# Patient Record
Sex: FEMALE | Race: WHITE | NOT HISPANIC OR LATINO | Employment: FULL TIME | URBAN - METROPOLITAN AREA
[De-identification: names, ages, dates, MRNs, and addresses within clinical notes are randomized per-mention and may not be internally consistent; named-entity substitution may affect disease eponyms.]

---

## 2018-09-26 ENCOUNTER — OFFICE VISIT (OUTPATIENT)
Dept: URGENT CARE | Facility: CLINIC | Age: 38
End: 2018-09-26
Payer: COMMERCIAL

## 2018-09-26 VITALS
OXYGEN SATURATION: 94 % | SYSTOLIC BLOOD PRESSURE: 108 MMHG | DIASTOLIC BLOOD PRESSURE: 76 MMHG | BODY MASS INDEX: 40.02 KG/M2 | HEART RATE: 67 BPM | TEMPERATURE: 97.6 F | HEIGHT: 66 IN | WEIGHT: 249 LBS | RESPIRATION RATE: 16 BRPM

## 2018-09-26 DIAGNOSIS — H66.003 ACUTE SUPPURATIVE OTITIS MEDIA OF BOTH EARS WITHOUT SPONTANEOUS RUPTURE OF TYMPANIC MEMBRANES, RECURRENCE NOT SPECIFIED: Primary | ICD-10-CM

## 2018-09-26 DIAGNOSIS — H10.32 ACUTE BACTERIAL CONJUNCTIVITIS OF LEFT EYE: ICD-10-CM

## 2018-09-26 PROCEDURE — 99214 OFFICE O/P EST MOD 30 MIN: CPT | Performed by: INTERNAL MEDICINE

## 2018-09-26 RX ORDER — SULFAMETHOXAZOLE AND TRIMETHOPRIM 800; 160 MG/1; MG/1
1 TABLET ORAL 2 TIMES DAILY
Qty: 20 TAB | Refills: 0 | Status: SHIPPED | OUTPATIENT
Start: 2018-09-26 | End: 2018-10-06

## 2018-09-26 RX ORDER — ESCITALOPRAM OXALATE 10 MG/1
10 TABLET ORAL DAILY
COMMUNITY
End: 2021-05-22

## 2018-09-26 RX ORDER — MOXIFLOXACIN 5 MG/ML
1 SOLUTION/ DROPS OPHTHALMIC 3 TIMES DAILY
Qty: 1 BOTTLE | Refills: 0 | Status: SHIPPED | OUTPATIENT
Start: 2018-09-26 | End: 2019-09-25

## 2018-09-26 ASSESSMENT — ENCOUNTER SYMPTOMS
NAUSEA: 0
WHEEZING: 0
SPUTUM PRODUCTION: 0
ABDOMINAL PAIN: 0
SINUS PAIN: 1
CHILLS: 0
CONSTIPATION: 0
COUGH: 1
SORE THROAT: 1
FEVER: 0
VOMITING: 0
DIARRHEA: 0
MYALGIAS: 1
SHORTNESS OF BREATH: 0
SWEATS: 0

## 2018-09-26 ASSESSMENT — COPD QUESTIONNAIRES: COPD: 0

## 2018-09-26 ASSESSMENT — PATIENT HEALTH QUESTIONNAIRE - PHQ9: CLINICAL INTERPRETATION OF PHQ2 SCORE: 0

## 2018-09-26 NOTE — PROGRESS NOTES
Subjective:   Tae Banegas is a 38 y.o. female who presents for Eye Problem (Started this morning, Swollen, and pink, slightly itchy) and Cough (x4 days, Constant, constant runny nose)        Conjunctivitis   This is a new problem. The current episode started today. The problem occurs constantly. The problem has been gradually worsening. Associated symptoms include congestion, coughing, myalgias and a sore throat. Pertinent negatives include no abdominal pain, chills, fever, nausea or vomiting.   Cough   This is a new problem. Episode onset: 3 days ago  The problem has been gradually worsening. The cough is non-productive. Associated symptoms include ear congestion, ear pain, myalgias, nasal congestion and a sore throat. Pertinent negatives include no chills, fever, shortness of breath, sweats or wheezing. Risk factors for lung disease include travel (Starting feeling sick in [facility]). There is no history of asthma, bronchitis, COPD, emphysema or pneumonia.       Pt was treated for pharyngitis before going to [place] with Azithromycin. Course completed on Sept 14th.  Denies history of asthma.  No shortness of breath.    Review of Systems   Constitutional: Positive for malaise/fatigue. Negative for chills and fever.   HENT: Positive for congestion, ear pain, sinus pain and sore throat.    Respiratory: Positive for cough. Negative for sputum production, shortness of breath and wheezing.    Gastrointestinal: Negative for abdominal pain, constipation, diarrhea, nausea and vomiting.   Musculoskeletal: Positive for myalgias.   All other systems reviewed and are negative.      PMH:  has a past medical history of Other specified symptom associated with female genital organs; Psychiatric disorder; and Unspecified disorder of menstruation and other abnormal bleeding from female genital tract.  MEDS:   Current Outpatient Prescriptions:   •  escitalopram (LEXAPRO) 10 MG Tab, Take 10 mg by mouth every day., Disp: , Rfl:  "  •  sulfamethoxazole-trimethoprim (BACTRIM DS) 800-160 MG tablet, Take 1 Tab by mouth 2 times a day for 10 days., Disp: 20 Tab, Rfl: 0  •  moxifloxacin (VIGAMOX) 0.5 % Solution, Place 1 Drop in both eyes 3 times a day., Disp: 1 Bottle, Rfl: 0  •  ondansetron (ZOFRAN ODT) 4 MG TBDP, Take 1 Tab by mouth every 8 hours as needed for Nausea/Vomiting., Disp: 10 Tab, Rfl: 0  •  phenazopyridine (PYRIDIUM) 200 MG TABS, Take 1 Tab by mouth 3 times a day as needed for Mild Pain., Disp: 15 Tab, Rfl: 0  •  Hydrocod Polst-Chlorphen Polst (TUSSIONEX PENNKINETIC ER) 10-8 MG/5ML LQCR, Take 5 mL by mouth every 12 hours., Disp: 50 mL, Rfl: 0  •  alprazolam (XANAX) 0.25 MG TABS, Take 0.25 mg by mouth 3 times a day as needed., Disp: , Rfl:   •  IBUPROFEN 200 MG PO TABS, PRN Pt takes 3 tabs at a time, Disp: , Rfl:   ALLERGIES:   Allergies   Allergen Reactions   • Penicillins Rash     SURGHX:   Past Surgical History:   Procedure Laterality Date   • ACL RECONSTRUCTION SCOPE  6/3/2010    Performed by AMANDA MOJICA at [facility] ORS   • GYN SURGERY  2001    right ovary removed for ovarian cyst   • DENTAL EXTRACTION(S)  2000    wisdom teeth      SOCHX:  reports that she has never smoked. She has never used smokeless tobacco. She reports that she drinks about 3.5 oz of alcohol per week . She reports that she does not use drugs.  Family History   Problem Relation Age of Onset   • Cancer Mother    • Heart Disease Unknown    • Cancer Unknown         Objective:   /76 (BP Location: Left arm, Patient Position: Sitting, BP Cuff Size: Large adult)   Pulse 67   Temp 36.4 °C (97.6 °F) (Temporal)   Resp 16   Ht 1.676 m (5' 6\")   Wt 112.9 kg (249 lb)   SpO2 94%   BMI 40.19 kg/m²     Physical Exam   Constitutional: She is oriented to person, place, and time. She appears well-developed and well-nourished. No distress.   HENT:   Head: Normocephalic and atraumatic.   Right Ear: Tympanic membrane is erythematous and retracted. A " middle ear effusion is present.   Left Ear: Tympanic membrane is erythematous and bulging. A middle ear effusion is present.   Eyes: Pupils are equal, round, and reactive to light. Lids are normal. Right eye exhibits discharge and exudate. Left eye exhibits no discharge and no exudate. Right conjunctiva is injected. Left conjunctiva is not injected.   Cardiovascular: Normal rate and regular rhythm.    Pulmonary/Chest: Effort normal and breath sounds normal.   Neurological: She is alert and oriented to person, place, and time.   Skin: Skin is warm and dry.   Psychiatric: She has a normal mood and affect. Her behavior is normal.   Vitals reviewed.      Repeat SpO2 98%  Assessment/Plan:     1. Acute suppurative otitis media of both ears without spontaneous rupture of tympanic membranes, recurrence not specified  sulfamethoxazole-trimethoprim (BACTRIM DS) 800-160 MG tablet   2. Acute bacterial conjunctivitis of left eye  moxifloxacin (VIGAMOX) 0.5 % Solution     Patient directed to take full course of abx regardless of sx resolution. If sx worsen or persist patient directed to return to clinic for reevaluation. Supportive care reviewed including: Decongestant, increase fluids, OTC cold medication.  Otitis media and conjunctivitis educational handout given to patient. Infection control and and hand hygiene reviewed.    Differential diagnosis, natural history, supportive care discussed. Follow-up with primary care provider within 7-10 days, emergency room precautions discussed.  Patient appears understanding of information.  Case and results reviewed and agree with treatment plan as outlined.  Dr. Frost

## 2018-09-26 NOTE — PATIENT INSTRUCTIONS
"Allegra-D or zyrtec-D    Otitis Media, Adult  Otitis media is redness, soreness, and puffiness (swelling) in the space just behind your eardrum (middle ear). It may be caused by allergies or infection. It often happens along with a cold.  Follow these instructions at home:  · Take your medicine as told. Finish it even if you start to feel better.  · Only take over-the-counter or prescription medicines for pain, discomfort, or fever as told by your doctor.  · Follow up with your doctor as told.  Contact a doctor if:  · You have otitis media only in one ear, or bleeding from your nose, or both.  · You notice a lump on your neck.  · You are not getting better in 3-5 days.  · You feel worse instead of better.  Get help right away if:  · You have pain that is not helped with medicine.  · You have puffiness, redness, or pain around your ear.  · You get a stiff neck.  · You cannot move part of your face (paralysis).  · You notice that the bone behind your ear hurts when you touch it.  This information is not intended to replace advice given to you by your health care provider. Make sure you discuss any questions you have with your health care provider.  Document Released: 06/05/2009 Document Revised: 05/25/2017 Document Reviewed: 07/15/2014  [employer] Interactive Patient Education © 2017 [employer]  Conjunctivitis  Conjunctivitis is commonly called \"pink eye.\" Conjunctivitis can be caused by bacterial or viral infection, allergies, or injuries. There is usually redness of the lining of the eye, itching, discomfort, and sometimes discharge. There may be deposits of matter along the eyelids. A viral infection usually causes a watery discharge, while a bacterial infection causes a yellowish, thick discharge. Pink eye is very contagious and spreads by direct contact.  You may be given antibiotic eyedrops as part of your treatment. Before using your eye medicine, remove all drainage from the eye by washing gently with warm " water and cotton balls. Continue to use the medication until you have awakened 2 mornings in a row without discharge from the eye. Do not rub your eye. This increases the irritation and helps spread infection. Use separate towels from other household members. Wash your hands with soap and water before and after touching your eyes. Use cold compresses to reduce pain and sunglasses to relieve irritation from light. Do not wear contact lenses or wear eye makeup until the infection is gone.  SEEK MEDICAL CARE IF:   · Your symptoms are not better after 3 days of treatment.  · You have increased pain or trouble seeing.  · The outer eyelids become very red or swollen.  Document Released: 01/25/2006 Document Revised: 03/11/2013 Document Reviewed: 12/18/2006  [employer]® Patient Information ©2014 [employer].

## 2018-09-27 ENCOUNTER — TELEPHONE (OUTPATIENT)
Dept: URGENT CARE | Facility: CLINIC | Age: 38
End: 2018-09-27

## 2018-10-13 ENCOUNTER — APPOINTMENT (OUTPATIENT)
Dept: RADIOLOGY | Facility: IMAGING CENTER | Age: 38
End: 2018-10-13
Attending: EMERGENCY MEDICINE
Payer: COMMERCIAL

## 2018-10-13 ENCOUNTER — OFFICE VISIT (OUTPATIENT)
Dept: URGENT CARE | Facility: CLINIC | Age: 38
End: 2018-10-13
Payer: COMMERCIAL

## 2018-10-13 VITALS
RESPIRATION RATE: 20 BRPM | TEMPERATURE: 97.8 F | HEART RATE: 60 BPM | WEIGHT: 240 LBS | BODY MASS INDEX: 38.57 KG/M2 | SYSTOLIC BLOOD PRESSURE: 110 MMHG | HEIGHT: 66 IN | DIASTOLIC BLOOD PRESSURE: 72 MMHG

## 2018-10-13 DIAGNOSIS — S83.002A PATELLAR SUBLUXATION, LEFT, INITIAL ENCOUNTER: ICD-10-CM

## 2018-10-13 DIAGNOSIS — S89.92XA KNEE INJURY, LEFT, INITIAL ENCOUNTER: ICD-10-CM

## 2018-10-13 PROCEDURE — 99213 OFFICE O/P EST LOW 20 MIN: CPT | Performed by: EMERGENCY MEDICINE

## 2018-10-13 PROCEDURE — 73564 X-RAY EXAM KNEE 4 OR MORE: CPT | Mod: 26,LT | Performed by: EMERGENCY MEDICINE

## 2018-10-13 ASSESSMENT — ENCOUNTER SYMPTOMS
SENSORY CHANGE: 0
FOCAL WEAKNESS: 0
JOINT SWELLING: 0
FEVER: 0
NUMBNESS: 0

## 2018-10-13 NOTE — PROGRESS NOTES
Subjective:      Tae Banegas is a 38 y.o. female who presents with Knee Injury (Hurt left knee coule days ago)            Knee Injury   This is a new problem. Episode onset: 3 days. The problem occurs daily. The problem has been rapidly improving. Pertinent negatives include no fever, joint swelling, numbness or rash. The symptoms are aggravated by bending. She has tried rest and NSAIDs for the symptoms. The treatment provided moderate relief.   PMH right ACL tear with reconstruction.  Notes popping sensation, pain anterolateral patellar region when sidestepping.  Denies additional trauma.  Review of Systems   Constitutional: Negative for fever.   Musculoskeletal: Negative for joint swelling.        No pain proximal or distal to the site.   Skin: Negative for rash.   Neurological: Negative for sensory change, focal weakness and numbness.       PMH:  has a past medical history of Other specified symptom associated with female genital organs; Psychiatric disorder; and Unspecified disorder of menstruation and other abnormal bleeding from female genital tract.  MEDS:   Current Outpatient Prescriptions:   •  escitalopram (LEXAPRO) 10 MG Tab, Take 10 mg by mouth every day., Disp: , Rfl:   •  moxifloxacin (VIGAMOX) 0.5 % Solution, Place 1 Drop in both eyes 3 times a day., Disp: 1 Bottle, Rfl: 0  •  ondansetron (ZOFRAN ODT) 4 MG TBDP, Take 1 Tab by mouth every 8 hours as needed for Nausea/Vomiting., Disp: 10 Tab, Rfl: 0  •  phenazopyridine (PYRIDIUM) 200 MG TABS, Take 1 Tab by mouth 3 times a day as needed for Mild Pain., Disp: 15 Tab, Rfl: 0  •  Hydrocod Polst-Chlorphen Polst (TUSSIONEX PENNKINETIC ER) 10-8 MG/5ML LQCR, Take 5 mL by mouth every 12 hours., Disp: 50 mL, Rfl: 0  •  alprazolam (XANAX) 0.25 MG TABS, Take 0.25 mg by mouth 3 times a day as needed., Disp: , Rfl:   •  IBUPROFEN 200 MG PO TABS, PRN Pt takes 3 tabs at a time, Disp: , Rfl:   ALLERGIES:   Allergies   Allergen Reactions   • Penicillins Rash  "    SURGHX:   Past Surgical History:   Procedure Laterality Date   • ACL RECONSTRUCTION SCOPE  6/3/2010    Performed by AMANDA MOJICA at SURGERY [facility] ORS   • GYN SURGERY  2001    right ovary removed for ovarian cyst   • DENTAL EXTRACTION(S)  2000    wisdom teeth      SOCHX:  reports that she has never smoked. She has never used smokeless tobacco. She reports that she drinks about 3.5 oz of alcohol per week . She reports that she does not use drugs.  FH: family history includes Cancer in her mother and unknown relative; Heart Disease in her unknown relative.     Objective:     /72 (BP Location: Right arm, Patient Position: Sitting, BP Cuff Size: Large adult)   Pulse 60   Temp 36.6 °C (97.8 °F) (Temporal)   Resp 20   Ht 1.676 m (5' 6\")   Wt 108.9 kg (240 lb)   BMI 38.74 kg/m²      Physical Exam   Constitutional: Vital signs are normal. She appears well-developed and well-nourished. She is cooperative. She does not have a sickly appearance. She does not appear ill. No distress.   Cardiovascular:   Pulses:       Dorsalis pedis pulses are 2+ on the left side.        Posterior tibial pulses are 2+ on the left side.   Musculoskeletal:        Left knee: She exhibits normal range of motion, no swelling, no effusion, no deformity, no LCL laxity, normal patellar mobility, normal meniscus and no MCL laxity. Tenderness found. Medial joint line and patellar tendon tenderness noted. No lateral joint line, no MCL and no LCL tenderness noted.   Negative Lockman, negative drawer.   Neurological: She is alert. Gait normal.   Distal motor function intact. Distal sensation to light touch and pressure intact.   Skin: Skin is warm, dry and intact. No rash noted.          Advised of possibility of associated mild ACL injury.     Assessment/Plan:     1. Patellar subluxation, left, initial encounter  Elevation, ice, OTC analgesia as needed.  Velcro compression sleeve as needed.  REF SPORTS MED    2. Knee injury, left, " initial encounter  - DX-KNEE COMPLETE 4+ LEFT; per radiologist:  No evidence of fracture or dislocation.  Small joint effusion is identified.    Mild left lateral patellar subluxation by my read.

## 2018-10-13 NOTE — PATIENT INSTRUCTIONS
Patellar Dislocation and Subluxation  The kneecap (patella) is located in a groove at the end of the thigh bone (femur). Patellar dislocation and patellar subluxation are injuries that happen when the patella slips out of its normal position. In a patellar subluxation, the patella slips partly out of the groove. In a patellar dislocation, it slips all the way out of the groove.  What are the causes?  This condition may be caused by:  · A hit to the knee.  · Twisting the knee when the foot is planted.  What increases the risk?  This condition is more likely to develop in:  · Athletes in their teens or 20s.  · People who have had this condition before.  · People who play certain kinds of sports, including:  ¨ Sports that include quick turns or changes in direction, or where there is contact, like soccer.  ¨ Sports that require jumping, such as basketball or volleyball.  ¨ Sports in which cleats are worn.  What are the signs or symptoms?  Symptoms of this condition include:  · Sudden pain in the knee.  · A misshapen knee.  · A popping sensation, followed by a feeling that something is out of place.  · Inability to bend or straighten the knee.  · Swelling in the knee.  How is this diagnosed?  This condition may be diagnosed with:  · A physical exam.  · An X-ray exam. This may be done to see the position of the patella or to see if a bone has broken.  · MRI. This may be done to look at the alignment of your knee and the ligaments that hold your patella in place.  How is this treated?  Your patella may move back into place on its own when you straighten your knee. If your patella does not move back into place on its own, your health care provider will move it back into place. After your patella is back in its normal position, treatment may involve:  · Wearing a knee brace to keep your knee from moving (keep it immobilized) while it heals.  · Doing exercises that help improve strength and movement in your knee.  · Taking  medicine to help with pain and inflammation.  · Applying ice to the knee to help with pain and inflammation.  · Having surgery to prevent the patella from slipping out of place or to clean out any loose cartilage in your joint. This may be needed if other treatments do not help or if the condition keeps happening.  Follow these instructions at home:  If you have a brace:  · Wear it as told by your health care provider. Remove it only as told by your health care provider.  · Loosen the brace if your toes tingle, become numb, or turn cold and blue.  · Do not let your brace get wet if it is not waterproof.  · Keep the brace clean.  · If your brace is not waterproof, cover it with a watertight covering when you take a bath or a shower.  Managing pain, stiffness, and swelling  · If directed, apply ice to the injured area.  ¨ Put ice in a plastic bag.  ¨ Place a towel between your skin and the bag.  ¨ Leave the ice on for 20 minutes, 2-3 times a day.  · Move your toes often to avoid stiffness and to lessen swelling.  Activity  · Return to your normal activities as told by your health care provider. Ask your health care provider what activities are safe for you.  · Do exercises as told by your health care provider.  General instructions  · Do not use the injured limb to support your body weight until your health care provider says that you can. Use crutches as told by your health care provider.  · Take over-the-counter and prescription medicines only as told by your health care provider.  · Keep all follow-up visits as told by your health care provider. This is important.  How is this prevented?  · Warm up and stretch before being active.  · Cool down and stretch after being active.  · Give your body time to rest between periods of activity.  · Make sure to use equipment that fits you.  · Be safe and responsible while being active to avoid falls.  · Do at least 150 minutes of moderate-intensity exercise each week, such as  brisk walking or water aerobics.  · Maintain physical fitness, including:  ¨ Strength.  ¨ Flexibility.  ¨ Cardiovascular fitness.  ¨ Endurance.  Get help right away if:  · The pain in your knee gets worse and is not relieved by medicine.  · The inflammation in your knee gets worse.  · Your knee catches or locks.  This information is not intended to replace advice given to you by your health care provider. Make sure you discuss any questions you have with your health care provider.  Document Released: 12/18/2006 Document Revised: 08/22/2017 Document Reviewed: 10/29/2016  [employer] Interactive Patient Education © 2017 [employer]

## 2018-10-16 ENCOUNTER — OFFICE VISIT (OUTPATIENT)
Dept: MEDICAL GROUP | Facility: CLINIC | Age: 38
End: 2018-10-16
Payer: COMMERCIAL

## 2018-10-16 VITALS
RESPIRATION RATE: 18 BRPM | BODY MASS INDEX: 38.57 KG/M2 | HEART RATE: 78 BPM | SYSTOLIC BLOOD PRESSURE: 122 MMHG | HEIGHT: 66 IN | DIASTOLIC BLOOD PRESSURE: 80 MMHG | TEMPERATURE: 98.6 F | WEIGHT: 240 LBS | OXYGEN SATURATION: 98 %

## 2018-10-16 DIAGNOSIS — S83.002A PATELLAR SUBLUXATION, LEFT, INITIAL ENCOUNTER: ICD-10-CM

## 2018-10-16 PROCEDURE — 99203 OFFICE O/P NEW LOW 30 MIN: CPT | Performed by: FAMILY MEDICINE

## 2018-10-16 NOTE — PROGRESS NOTES
CHIEF COMPLAINT:  Tae Banegas female presenting at the request of Catracho Roberto MD for evaluation of knee pain.     Tae Banegas is complaining of left knee pain  Date of injury, October 10, 2018   Mechanism of injury, pivoting/sitting down onto a airline seat  Felt POSITIVE pop in the patellar region  Pain is at the anterolateral knee  Quality is aching  Pain is non-radiating   Improved with resting  Aggravated by movement  no prior problems with this area in the past, but she does have a history of RIGHT ACL tear several years ago  Prior Treatments: Seen in urgent care  Prior studies: X-Ray   Medications tried for pain include: ibuprofen (OTC) which is not helping for pain  Mechanical Symptom history: No Locking, but she has occasional clicking with twisting    REVIEW OF SYSTEMS  No Nausea, No Vomiting, No Chest Pain, No Shortness of Breath, No Dizziness, No Headache      PAST MEDICAL HISTORY:   History reviewed. No pertinent past medical history.    PMH:  has a past medical history of Other specified symptom associated with female genital organs; Psychiatric disorder; and Unspecified disorder of menstruation and other abnormal bleeding from female genital tract.  MEDS:   Current Outpatient Prescriptions:   •  escitalopram (LEXAPRO) 10 MG Tab, Take 10 mg by mouth every day., Disp: , Rfl:   •  moxifloxacin (VIGAMOX) 0.5 % Solution, Place 1 Drop in both eyes 3 times a day., Disp: 1 Bottle, Rfl: 0  •  ondansetron (ZOFRAN ODT) 4 MG TBDP, Take 1 Tab by mouth every 8 hours as needed for Nausea/Vomiting., Disp: 10 Tab, Rfl: 0  •  phenazopyridine (PYRIDIUM) 200 MG TABS, Take 1 Tab by mouth 3 times a day as needed for Mild Pain., Disp: 15 Tab, Rfl: 0  •  Hydrocod Polst-Chlorphen Polst (TUSSIONEX PENNKINETIC ER) 10-8 MG/5ML LQCR, Take 5 mL by mouth every 12 hours., Disp: 50 mL, Rfl: 0  •  alprazolam (XANAX) 0.25 MG TABS, Take 0.25 mg by mouth 3 times a day as needed., Disp: , Rfl:   •  IBUPROFEN 200 MG PO TABS,  "PRN Pt takes 3 tabs at a time, Disp: , Rfl:   ALLERGIES:   Allergies   Allergen Reactions   • Penicillins Rash     SURGHX:   Past Surgical History:   Procedure Laterality Date   • ACL RECONSTRUCTION SCOPE  6/3/2010    Performed by AMANDA MOJICA at SURGERY [facility] ORS   • GYN SURGERY  2001    right ovary removed for ovarian cyst   • DENTAL EXTRACTION(S)  2000    wisdom teeth      SOCHX:  reports that she has never smoked. She has never used smokeless tobacco. She reports that she drinks about 3.5 oz of alcohol per week . She reports that she does not use drugs.  FH: Family history was reviewed, no pertinent findings to report     PHYSICAL EXAM:  /80 (BP Location: Right arm, Patient Position: Sitting, BP Cuff Size: Adult)   Pulse 78   Temp 37 °C (98.6 °F) (Temporal)   Resp 18   Ht 1.676 m (5' 6\")   Wt 108.9 kg (240 lb)   SpO2 98%   BMI 38.74 kg/m²      slightly overweight in no apparent distress, alert and oriented x 3.  Gait: normal     RIGHT Knee:  Slight Varus and No Swelling  Range of Motion Intact  Trace effusion  Patellar No tenderness and no apprehension  Medial Joint Line Non-tender and NEGATIVE Patito  Lateral Joint Line Non-tender and NEGATIVE Patito  Trace Laxity with Varus stress  Trace Laxity with Valgus stress  Lachman's testing is Trace  Posterior Drawer Testing is Trace  The leg is otherwise neurovascularly intact     LEFT Knee:  Slight Varus and No Swelling   Range of Motion Intact  Trace effusion  Patellar Medial facet tenderness, Apprehension and Extensor mechanism intact POSITIVE patellar clicking  Medial Joint Line Non-tender and NEGATIVE Patito  Lateral Joint Line Non-tender and NEGATIVE Patito  Trace Laxity with Varus stress  Trace Laxity with Valgus stress  Lachman's testing is Trace  Posterior Drawer Testing is Trace  The leg is otherwise neurovascularly intact    Additional Findings: None      1. Patellar subluxation, left, initial encounter  REFERRAL TO PHYSICAL " THERAPY Reason for Therapy: Eval/Treat/Report     Provided with patellar stabilizer brace  Provided with home exercises  Referral for formal physical therapy to strengthen quad muscles and hip muscles/gluteus medius    Return in about 4 weeks (around 11/13/2018).  To see how she is doing with formal physical therapy        10/13/2018 10:13 AM    HISTORY/REASON FOR EXAM:  Left knee pain after twisting injury      TECHNIQUE/EXAM DESCRIPTION AND NUMBER OF VIEWS:  4 views of the LEFT knee.    COMPARISON: None    FINDINGS:  Bone density is normal.  There is no evidence of fracture or dislocation.  There is no evidence of arthropathy.  There is a small joint effusion.   Impression       No evidence of fracture or dislocation.  Small joint effusion is identified.     done elsewhere and reviewed independently by me    Thank you Catracho Roberto MD for allowing me to participate in caring for your patient.

## 2018-11-13 ENCOUNTER — OFFICE VISIT (OUTPATIENT)
Dept: MEDICAL GROUP | Facility: CLINIC | Age: 38
End: 2018-11-13
Payer: COMMERCIAL

## 2018-11-13 VITALS
DIASTOLIC BLOOD PRESSURE: 76 MMHG | SYSTOLIC BLOOD PRESSURE: 118 MMHG | HEIGHT: 66 IN | TEMPERATURE: 98.6 F | BODY MASS INDEX: 38.57 KG/M2 | OXYGEN SATURATION: 97 % | WEIGHT: 240 LBS | RESPIRATION RATE: 16 BRPM | HEART RATE: 84 BPM

## 2018-11-13 DIAGNOSIS — S83.002D PATELLAR SUBLUXATION, LEFT, SUBSEQUENT ENCOUNTER: ICD-10-CM

## 2018-11-13 PROCEDURE — 99213 OFFICE O/P EST LOW 20 MIN: CPT | Performed by: FAMILY MEDICINE

## 2018-11-14 NOTE — PROGRESS NOTES
CHIEF COMPLAINT:  Follow UP left knee pain  Date of injury, October 10, 2018   Mechanism of injury, pivoting/sitting down onto a airline seat  Felt POSITIVE pop in the patellar region  Pain at the anterolateral knee is IMPROVED   Attended PT and doing well after 3 sessions    REVIEW OF SYSTEMS  No Nausea, No Vomiting, No Chest Pain, No Shortness of Breath, No Dizziness, No Headache      PAST MEDICAL HISTORY:   History reviewed. No pertinent past medical history.    PMH:  has a past medical history of Other specified symptom associated with female genital organs; Psychiatric disorder; and Unspecified disorder of menstruation and other abnormal bleeding from female genital tract.  MEDS:   Current Outpatient Prescriptions:   •  escitalopram (LEXAPRO) 10 MG Tab, Take 10 mg by mouth every day., Disp: , Rfl:   •  moxifloxacin (VIGAMOX) 0.5 % Solution, Place 1 Drop in both eyes 3 times a day., Disp: 1 Bottle, Rfl: 0  •  ondansetron (ZOFRAN ODT) 4 MG TBDP, Take 1 Tab by mouth every 8 hours as needed for Nausea/Vomiting., Disp: 10 Tab, Rfl: 0  •  phenazopyridine (PYRIDIUM) 200 MG TABS, Take 1 Tab by mouth 3 times a day as needed for Mild Pain., Disp: 15 Tab, Rfl: 0  •  Hydrocod Polst-Chlorphen Polst (TUSSIONEX PENNKINETIC ER) 10-8 MG/5ML LQCR, Take 5 mL by mouth every 12 hours., Disp: 50 mL, Rfl: 0  •  alprazolam (XANAX) 0.25 MG TABS, Take 0.25 mg by mouth 3 times a day as needed., Disp: , Rfl:   •  IBUPROFEN 200 MG PO TABS, PRN Pt takes 3 tabs at a time, Disp: , Rfl:   ALLERGIES:   Allergies   Allergen Reactions   • Penicillins Rash     SURGHX:   Past Surgical History:   Procedure Laterality Date   • ACL RECONSTRUCTION SCOPE  6/3/2010    Performed by AMANDA MOJICA at SURGERY [facility] ORS   • GYN SURGERY  2001    right ovary removed for ovarian cyst   • DENTAL EXTRACTION(S)  2000    wisdom teeth      SOCHX:  reports that she has never smoked. She has never used smokeless tobacco. She reports that she drinks about 3.5  "oz of alcohol per week . She reports that she does not use drugs.  FH: Family history was reviewed, no pertinent findings to report     PHYSICAL EXAM:  /76 (BP Location: Right arm, Patient Position: Sitting, BP Cuff Size: Adult)   Pulse 84   Temp 37 °C (98.6 °F) (Temporal)   Resp 16   Ht 1.676 m (5' 6\")   Wt 108.9 kg (240 lb)   SpO2 97%   BMI 38.74 kg/m²      slightly overweight in no apparent distress, alert and oriented x 3.  Gait: normal     RIGHT Knee:  Slight Varus and No Swelling  Range of Motion Intact  Trace effusion  Patellar No tenderness and no apprehension    LEFT Knee:  Slight Varus and No Swelling   Range of Motion Intact  Trace effusion  Patellar Medial facet tenderness, Apprehension and Extensor mechanism intact POSITIVE patellar clicking    1. Patellar subluxation, left, subsequent encounter       Continue patellar stabilizer brace PRN  Continue home exercises  Continue formal physical therapy    Return if symptoms worsen or fail to improve.         10/13/2018 10:13 AM    HISTORY/REASON FOR EXAM:  Left knee pain after twisting injury      TECHNIQUE/EXAM DESCRIPTION AND NUMBER OF VIEWS:  4 views of the LEFT knee.    COMPARISON: None    FINDINGS:  Bone density is normal.  There is no evidence of fracture or dislocation.  There is no evidence of arthropathy.  There is a small joint effusion.   Impression       No evidence of fracture or dislocation.  Small joint effusion is identified.     Thank you Catracho Roberto MD for allowing me to participate in caring for your patient.  "

## 2019-02-16 ENCOUNTER — HOSPITAL ENCOUNTER (OUTPATIENT)
Dept: LAB | Facility: MEDICAL CENTER | Age: 39
End: 2019-02-16
Attending: OBSTETRICS & GYNECOLOGY
Payer: COMMERCIAL

## 2019-02-16 LAB
25(OH)D3 SERPL-MCNC: 11 NG/ML (ref 30–100)
ALBUMIN SERPL BCP-MCNC: 4.4 G/DL (ref 3.2–4.9)
ALBUMIN/GLOB SERPL: 1.3 G/DL
ALP SERPL-CCNC: 43 U/L (ref 30–99)
ALT SERPL-CCNC: 33 U/L (ref 2–50)
ANION GAP SERPL CALC-SCNC: 7 MMOL/L (ref 0–11.9)
AST SERPL-CCNC: 21 U/L (ref 12–45)
BASOPHILS # BLD AUTO: 0.9 % (ref 0–1.8)
BASOPHILS # BLD: 0.06 K/UL (ref 0–0.12)
BILIRUB SERPL-MCNC: 0.8 MG/DL (ref 0.1–1.5)
BUN SERPL-MCNC: 15 MG/DL (ref 8–22)
CALCIUM SERPL-MCNC: 9.3 MG/DL (ref 8.5–10.5)
CHLORIDE SERPL-SCNC: 105 MMOL/L (ref 96–112)
CHOLEST SERPL-MCNC: 234 MG/DL (ref 100–199)
CO2 SERPL-SCNC: 28 MMOL/L (ref 20–33)
CREAT SERPL-MCNC: 0.67 MG/DL (ref 0.5–1.4)
CRP SERPL HS-MCNC: 3.3 MG/L (ref 0–7.5)
EOSINOPHIL # BLD AUTO: 0.11 K/UL (ref 0–0.51)
EOSINOPHIL NFR BLD: 1.7 % (ref 0–6.9)
ERYTHROCYTE [DISTWIDTH] IN BLOOD BY AUTOMATED COUNT: 43.5 FL (ref 35.9–50)
ESTRADIOL SERPL-MCNC: 105 PG/ML
FSH SERPL-ACNC: 9.8 MIU/ML
GLOBULIN SER CALC-MCNC: 3.3 G/DL (ref 1.9–3.5)
GLUCOSE SERPL-MCNC: 113 MG/DL (ref 65–99)
HCT VFR BLD AUTO: 50.2 % (ref 37–47)
HDLC SERPL-MCNC: 59 MG/DL
HGB BLD-MCNC: 16.7 G/DL (ref 12–16)
IMM GRANULOCYTES # BLD AUTO: 0.01 K/UL (ref 0–0.11)
IMM GRANULOCYTES NFR BLD AUTO: 0.2 % (ref 0–0.9)
LDLC SERPL CALC-MCNC: 159 MG/DL
LYMPHOCYTES # BLD AUTO: 2.2 K/UL (ref 1–4.8)
LYMPHOCYTES NFR BLD: 34 % (ref 22–41)
MCH RBC QN AUTO: 32.1 PG (ref 27–33)
MCHC RBC AUTO-ENTMCNC: 33.3 G/DL (ref 33.6–35)
MCV RBC AUTO: 96.4 FL (ref 81.4–97.8)
MONOCYTES # BLD AUTO: 0.47 K/UL (ref 0–0.85)
MONOCYTES NFR BLD AUTO: 7.3 % (ref 0–13.4)
NEUTROPHILS # BLD AUTO: 3.62 K/UL (ref 2–7.15)
NEUTROPHILS NFR BLD: 55.9 % (ref 44–72)
NRBC # BLD AUTO: 0 K/UL
NRBC BLD-RTO: 0 /100 WBC
PLATELET # BLD AUTO: 208 K/UL (ref 164–446)
PMV BLD AUTO: 11 FL (ref 9–12.9)
POTASSIUM SERPL-SCNC: 3.9 MMOL/L (ref 3.6–5.5)
PROGEST SERPL-MCNC: 0.23 NG/ML
PROT SERPL-MCNC: 7.7 G/DL (ref 6–8.2)
RBC # BLD AUTO: 5.21 M/UL (ref 4.2–5.4)
SODIUM SERPL-SCNC: 140 MMOL/L (ref 135–145)
T3FREE SERPL-MCNC: 3.95 PG/ML (ref 2.4–4.2)
T4 FREE SERPL-MCNC: 0.8 NG/DL (ref 0.53–1.43)
TRIGL SERPL-MCNC: 78 MG/DL (ref 0–149)
TSH SERPL DL<=0.005 MIU/L-ACNC: 0.95 UIU/ML (ref 0.38–5.33)
WBC # BLD AUTO: 6.5 K/UL (ref 4.8–10.8)

## 2019-02-16 PROCEDURE — 86141 C-REACTIVE PROTEIN HS: CPT

## 2019-02-16 PROCEDURE — 80053 COMPREHEN METABOLIC PANEL: CPT

## 2019-02-16 PROCEDURE — 80061 LIPID PANEL: CPT

## 2019-02-16 PROCEDURE — 82670 ASSAY OF TOTAL ESTRADIOL: CPT

## 2019-02-16 PROCEDURE — 84144 ASSAY OF PROGESTERONE: CPT

## 2019-02-16 PROCEDURE — 84443 ASSAY THYROID STIM HORMONE: CPT

## 2019-02-16 PROCEDURE — 84481 FREE ASSAY (FT-3): CPT

## 2019-02-16 PROCEDURE — 85025 COMPLETE CBC W/AUTO DIFF WBC: CPT

## 2019-02-16 PROCEDURE — 84439 ASSAY OF FREE THYROXINE: CPT

## 2019-02-16 PROCEDURE — 82306 VITAMIN D 25 HYDROXY: CPT

## 2019-02-16 PROCEDURE — 83036 HEMOGLOBIN GLYCOSYLATED A1C: CPT

## 2019-02-16 PROCEDURE — 36415 COLL VENOUS BLD VENIPUNCTURE: CPT

## 2019-02-16 PROCEDURE — 83001 ASSAY OF GONADOTROPIN (FSH): CPT

## 2019-02-17 LAB
EST. AVERAGE GLUCOSE BLD GHB EST-MCNC: 117 MG/DL
HBA1C MFR BLD: 5.7 % (ref 0–5.6)

## 2019-03-08 ENCOUNTER — HOSPITAL ENCOUNTER (OUTPATIENT)
Dept: RADIOLOGY | Facility: MEDICAL CENTER | Age: 39
End: 2019-03-08
Attending: OBSTETRICS & GYNECOLOGY
Payer: COMMERCIAL

## 2019-03-08 DIAGNOSIS — N92.1 MENORRHAGIA WITH IRREGULAR CYCLE: ICD-10-CM

## 2019-03-08 DIAGNOSIS — N92.6 IRREGULAR MENSTRUAL CYCLE: ICD-10-CM

## 2019-03-08 PROCEDURE — 76830 TRANSVAGINAL US NON-OB: CPT

## 2019-09-25 ENCOUNTER — HOSPITAL ENCOUNTER (OUTPATIENT)
Facility: MEDICAL CENTER | Age: 39
End: 2019-09-25
Attending: PHYSICIAN ASSISTANT
Payer: COMMERCIAL

## 2019-09-25 ENCOUNTER — OFFICE VISIT (OUTPATIENT)
Dept: URGENT CARE | Facility: CLINIC | Age: 39
End: 2019-09-25
Payer: COMMERCIAL

## 2019-09-25 VITALS
BODY MASS INDEX: 37.93 KG/M2 | WEIGHT: 235 LBS | OXYGEN SATURATION: 94 % | HEART RATE: 65 BPM | DIASTOLIC BLOOD PRESSURE: 84 MMHG | RESPIRATION RATE: 16 BRPM | SYSTOLIC BLOOD PRESSURE: 112 MMHG | TEMPERATURE: 98.2 F

## 2019-09-25 DIAGNOSIS — R30.0 DYSURIA: ICD-10-CM

## 2019-09-25 DIAGNOSIS — R30.0 DYSURIA: Primary | ICD-10-CM

## 2019-09-25 LAB
APPEARANCE UR: NORMAL
BILIRUB UR STRIP-MCNC: NORMAL MG/DL
COLOR UR AUTO: YELLOW
GLUCOSE UR STRIP.AUTO-MCNC: NORMAL MG/DL
KETONES UR STRIP.AUTO-MCNC: NORMAL MG/DL
LEUKOCYTE ESTERASE UR QL STRIP.AUTO: NORMAL
NITRITE UR QL STRIP.AUTO: NORMAL
PH UR STRIP.AUTO: 7 [PH] (ref 5–8)
PROT UR QL STRIP: 100 MG/DL
RBC UR QL AUTO: NORMAL
SP GR UR STRIP.AUTO: 1.02
UROBILINOGEN UR STRIP-MCNC: 0.2 MG/DL

## 2019-09-25 PROCEDURE — 81002 URINALYSIS NONAUTO W/O SCOPE: CPT | Performed by: PHYSICIAN ASSISTANT

## 2019-09-25 PROCEDURE — 87186 SC STD MICRODIL/AGAR DIL: CPT

## 2019-09-25 PROCEDURE — 87077 CULTURE AEROBIC IDENTIFY: CPT

## 2019-09-25 PROCEDURE — 99214 OFFICE O/P EST MOD 30 MIN: CPT | Performed by: PHYSICIAN ASSISTANT

## 2019-09-25 PROCEDURE — 87086 URINE CULTURE/COLONY COUNT: CPT

## 2019-09-25 RX ORDER — SULFAMETHOXAZOLE AND TRIMETHOPRIM 800; 160 MG/1; MG/1
1 TABLET ORAL EVERY 12 HOURS
Qty: 10 TAB | Refills: 0 | Status: SHIPPED | OUTPATIENT
Start: 2019-09-25 | End: 2019-09-30

## 2019-09-25 RX ORDER — PHENAZOPYRIDINE HYDROCHLORIDE 200 MG/1
200 TABLET, FILM COATED ORAL 3 TIMES DAILY
Qty: 6 TAB | Refills: 0 | Status: SHIPPED | OUTPATIENT
Start: 2019-09-25 | End: 2019-09-27

## 2019-09-25 NOTE — PROGRESS NOTES
"Subjective:      Pt is a 39 y.o. female who presents with UTI            HPI  This is a new problem. PT comes into the UC with a chief complaint of dysuria, burning on urination, urgency, frequency, and bladder pressure x 2 days. PT denies fevers or chills, CP, SOB, NVD, paresthesias, headaches, dizziness, change in vision, hives, or joint pain. PT states the pain is a 6/10 with burning upon urination, aching in nature and worse at night. Pt states they have not taken any RX meds for this issue. Pt denies flank or back pain as well. The pt's medication list, problem list, and allergies have been evaluated and reviewed during today's visit.      PMH:  Past Medical History:   Diagnosis Date   • Other specified symptom associated with female genital organs     \"irregular periods\"   • Psychiatric disorder     anxiety - medicated, none currently   • Unspecified disorder of menstruation and other abnormal bleeding from female genital tract     was on medication, but not now       PSH:  Past Surgical History:   Procedure Laterality Date   • ACL RECONSTRUCTION SCOPE  6/3/2010    Performed by AMANDA MOJICA at SURGERY [facility] ORS   • GYN SURGERY  2001    right ovary removed for ovarian cyst   • DENTAL EXTRACTION(S)  2000    wisdom teeth        Fam Hx:    family history includes Cancer in her mother and another family member; Heart Disease in an other family member.  Family Status   Relation Name Status   • Mo  Alive   • Fa  Alive   • OTHER  (Not Specified)   • OTHER  (Not Specified)       Soc HX:  Social History     Socioeconomic History   • Marital status: Single     Spouse name: Not on file   • Number of children: Not on file   • Years of education: Not on file   • Highest education level: Not on file   Occupational History   • Not on file   Social Needs   • Financial resource strain: Not on file   • Food insecurity:     Worry: Not on file     Inability: Not on file   • Transportation needs:     Medical: Not on file "     Non-medical: Not on file   Tobacco Use   • Smoking status: Never Smoker   • Smokeless tobacco: Never Used   Substance and Sexual Activity   • Alcohol use: Yes     Alcohol/week: 3.5 oz     Types: 7 Standard drinks or equivalent per week     Comment: daily   • Drug use: No   • Sexual activity: Not on file   Lifestyle   • Physical activity:     Days per week: Not on file     Minutes per session: Not on file   • Stress: Not on file   Relationships   • Social connections:     Talks on phone: Not on file     Gets together: Not on file     Attends Mosque service: Not on file     Active member of club or organization: Not on file     Attends meetings of clubs or organizations: Not on file     Relationship status: Not on file   • Intimate partner violence:     Fear of current or ex partner: Not on file     Emotionally abused: Not on file     Physically abused: Not on file     Forced sexual activity: Not on file   Other Topics Concern   • Not on file   Social History Narrative   • Not on file         Medications:    Current Outpatient Medications:   •  progesterone (PROMETRIUM) 200 MG capsule, TAKE 1 CAPSULE BY MOUTH EVERY DAY AT BEDTIME ON DAYS 16-25 OF THE CYCLE, Disp: , Rfl: 9  •  sulfamethoxazole-trimethoprim (BACTRIM DS) 800-160 MG tablet, Take 1 Tab by mouth every 12 hours for 5 days., Disp: 10 Tab, Rfl: 0  •  phenazopyridine (PYRIDIUM) 200 MG Tab, Take 1 Tab by mouth 3 times a day for 2 days., Disp: 6 Tab, Rfl: 0  •  escitalopram (LEXAPRO) 10 MG Tab, Take 10 mg by mouth every day., Disp: , Rfl:   •  IBUPROFEN 200 MG PO TABS, PRN Pt takes 3 tabs at a time, Disp: , Rfl:   •  alprazolam (XANAX) 0.25 MG TABS, Take 0.25 mg by mouth 3 times a day as needed., Disp: , Rfl:       Allergies:  Penicillins    ROS    Review of Systems   Constitutional: Negative for fever, chills and malaise/fatigue.   HENT: Negative for congestion and sore throat.    Eyes: Negative for blurred vision, double vision and photophobia.    Respiratory: Negative for cough and shortness of breath.    Cardiovascular: Negative for chest pain and palpitations.   Gastrointestinal: Negative for nausea, vomiting, abdominal pain, diarrhea and constipation.   Genitourinary: Positive for dysuria, urgency and frequency.   Musculoskeletal: Negative for joint pain and myalgias.   Skin: Negative for rash.   Neurological: Negative for dizziness, tingling and headaches.   Endo/Heme/Allergies: Does not bruise/bleed easily.   Psychiatric/Behavioral: Negative for depression. The patient is not nervous/anxious.         Objective:     /84 (BP Location: Right arm, Patient Position: Sitting, BP Cuff Size: Adult)   Pulse 65   Temp 36.8 °C (98.2 °F) (Temporal)   Resp 16   Wt 106.6 kg (235 lb)   SpO2 94%   BMI 37.93 kg/m²      Physical Exam      Physical Exam   Constitutional: She is oriented to person, place, and time. She appears well-developed and well-nourished. No distress.   HENT:   Head: Normocephalic and atraumatic.   Right Ear: External ear normal.   Left Ear: External ear normal.   Nose: Nose normal.   Mouth/Throat: Oropharynx is clear and moist. No oropharyngeal exudate.   Eyes: Conjunctivae normal and EOM are normal. Pupils are equal, round, and reactive to light.   Neck: Normal range of motion. Neck supple. No thyromegaly present.   Cardiovascular: Normal rate, regular rhythm, normal heart sounds and intact distal pulses.  Exam reveals no gallop and no friction rub.    No murmur heard.  Pulmonary/Chest: Effort normal and breath sounds normal. No respiratory distress. She has no wheezes. She has no rales. She exhibits no tenderness.   Abdominal: Soft. Bowel sounds are normal. She exhibits no distension and no mass. There is no tenderness. There is no rebound and no guarding.   Genitourinary:        Pt deferred   Musculoskeletal: Normal range of motion. She exhibits no edema and no tenderness.   Lymphadenopathy:     She has no cervical adenopathy.    Neurological: She is alert and oriented to person, place, and time. She has normal reflexes. No cranial nerve deficit.   Skin: Skin is warm and dry. No rash noted. No erythema.   Psychiatric: She has a normal mood and affect. Her behavior is normal. Judgment and thought content normal.          Assessment/Plan:     1. Dysuria    - POCT Urinalysis-->LEUKS AND BLOOD  - Urine Culture; Future  - sulfamethoxazole-trimethoprim (BACTRIM DS) 800-160 MG tablet; Take 1 Tab by mouth every 12 hours for 5 days.  Dispense: 10 Tab; Refill: 0  - phenazopyridine (PYRIDIUM) 200 MG Tab; Take 1 Tab by mouth 3 times a day for 2 days.  Dispense: 6 Tab; Refill: 0      Rest, fluids encouraged.  AVS with medical info given.  Pt was in full understanding and agreement with the plan.  Differential diagnosis, natural history, supportive care, and indications for immediate follow-up discussed. All questions answered. Patient agrees with the plan of care.  Follow-up as needed if symptoms worsen or fail to improve.

## 2019-09-27 LAB
BACTERIA UR CULT: ABNORMAL
BACTERIA UR CULT: ABNORMAL
SIGNIFICANT IND 70042: ABNORMAL
SITE SITE: ABNORMAL
SOURCE SOURCE: ABNORMAL

## 2019-09-29 ENCOUNTER — TELEPHONE (OUTPATIENT)
Dept: URGENT CARE | Facility: CLINIC | Age: 39
End: 2019-09-29

## 2019-09-29 NOTE — TELEPHONE ENCOUNTER
Called and left message with pt about urine culture results which came back positive for E.coli.   I told her she could continue the abx therapy with a positive urine culture which was sensitive to the abx she was placed on.  Encouraged Pt to call back with questions.  Tarun Whitley PA-C

## 2020-06-24 ENCOUNTER — OFFICE VISIT (OUTPATIENT)
Dept: URGENT CARE | Facility: CLINIC | Age: 40
End: 2020-06-24
Payer: COMMERCIAL

## 2020-06-24 VITALS
DIASTOLIC BLOOD PRESSURE: 88 MMHG | WEIGHT: 250 LBS | HEIGHT: 66 IN | RESPIRATION RATE: 16 BRPM | OXYGEN SATURATION: 95 % | TEMPERATURE: 98.1 F | HEART RATE: 67 BPM | SYSTOLIC BLOOD PRESSURE: 120 MMHG | BODY MASS INDEX: 40.18 KG/M2

## 2020-06-24 DIAGNOSIS — H10.021 PINK EYE DISEASE OF RIGHT EYE: ICD-10-CM

## 2020-06-24 PROCEDURE — 99214 OFFICE O/P EST MOD 30 MIN: CPT | Performed by: FAMILY MEDICINE

## 2020-06-24 RX ORDER — CIPROFLOXACIN HYDROCHLORIDE 3.5 MG/ML
1 SOLUTION/ DROPS TOPICAL
Qty: 1 BOTTLE | Refills: 0 | Status: SHIPPED | OUTPATIENT
Start: 2020-06-24 | End: 2020-06-24 | Stop reason: SDUPTHER

## 2020-06-24 RX ORDER — CIPROFLOXACIN HYDROCHLORIDE 3.5 MG/ML
1 SOLUTION/ DROPS TOPICAL
Qty: 1 BOTTLE | Refills: 0 | Status: SHIPPED | OUTPATIENT
Start: 2020-06-24 | End: 2020-07-01

## 2020-06-24 ASSESSMENT — FIBROSIS 4 INDEX: FIB4 SCORE: 0.65

## 2020-06-24 ASSESSMENT — PAIN SCALES - GENERAL: PAINLEVEL: 2=MINIMAL-SLIGHT

## 2020-06-24 NOTE — PROGRESS NOTES
"    Chief Complaint   Patient presents with   • Eye Problem     x 1 day redness no discharge            CC:  here for \"pink eye\"      Patient comes in complaining of rt eye redness for one day.   C/o clear discharge from the eye.   reports no eye pain, just some itchiness as well as irritation.  visual acuity is unchanged.    Denies trauma or potential foreign body.   She  has no concurrent fever, chills, cough or upper airway congestion. No sinus pain or pressure.   has not tried anything for this. Nothing seems to make it better or worse.          Social History     Tobacco Use   • Smoking status: Never Smoker   • Smokeless tobacco: Never Used   Substance Use Topics   • Alcohol use: Yes     Alcohol/week: 3.5 oz     Types: 7 Standard drinks or equivalent per week     Comment: daily   • Drug use: No              Current Outpatient Medications on File Prior to Visit   Medication Sig Dispense Refill   • escitalopram (LEXAPRO) 10 MG Tab Take 10 mg by mouth every day.     • IBUPROFEN 200 MG PO TABS PRN Pt takes 3 tabs at a time     • progesterone (PROMETRIUM) 200 MG capsule TAKE 1 CAPSULE BY MOUTH EVERY DAY AT BEDTIME ON DAYS 16-25 OF THE CYCLE  9   • alprazolam (XANAX) 0.25 MG TABS Take 0.25 mg by mouth 3 times a day as needed.       No current facility-administered medications on file prior to visit.            Past Medical History:   Diagnosis Date   • Other specified symptom associated with female genital organs     \"irregular periods\"   • Psychiatric disorder     anxiety - medicated, none currently   • Unspecified disorder of menstruation and other abnormal bleeding from female genital tract     was on medication, but not now             ROS          Review of Systems   Constitutional: Negative for fever, chills and weight loss.   HENT - denies cough, ear pain, congestion, sore throat  Eyes: denies vision changes, + discharge  Respiratory: Negative for cough and wheezing.    Cardiovascular: Negative for chest pain " "or PND.   Gastrointestinal:  No abdominal pain,  nausea, vomiting, diarrhea.  Negative for  blood in stool.    - no discharge, dysuria, frequency.      Neurological: Negative for dizziness and headaches.   musculoskeletal - denies myalgias, calf pain  Psych - denies anxiety/depression/mood changes.  Skin: no itching or rash  All other systems reviewed and are negative.    Objective:    /88   Pulse 67   Temp 36.7 °C (98.1 °F)   Resp 16   Ht 1.676 m (5' 6\")   Wt 113.4 kg (250 lb)   SpO2 95%     EXAM      HEENT - PERRLA, EOMI.  There is bilateral conjunctival injection and discharge.  No posterior pharyngeal erythema or exudates  No oral cavity lesions  Ears - TMs both clear.     Neuro - alert and oriented x3. CN 2-12 grossly intact.  Lungs - CTA. No wheezes, rhonchi or rales.  Heart - regular rate and rhythm without murmur.  Musculoskeletal - No lower extremity edema noted.     Psych - behavior normal    assessment & plan         1. Pink eye disease of right eye   advised not to wear contact lenses x 1 wk    - ciprofloxacin (CILOXIN) 0.3 % Solution; Place 1 Drop in right eye 5 Times a Day for 7 days.  Dispense: 1 Bottle; Refill: 0    Follow up in one week if no improvement, sooner if symptoms worsen.     "

## 2020-12-18 ENCOUNTER — HOSPITAL ENCOUNTER (OUTPATIENT)
Facility: MEDICAL CENTER | Age: 40
End: 2020-12-18
Attending: PHYSICIAN ASSISTANT
Payer: COMMERCIAL

## 2020-12-18 ENCOUNTER — OFFICE VISIT (OUTPATIENT)
Dept: URGENT CARE | Facility: CLINIC | Age: 40
End: 2020-12-18
Payer: COMMERCIAL

## 2020-12-18 VITALS
HEART RATE: 97 BPM | WEIGHT: 262.6 LBS | DIASTOLIC BLOOD PRESSURE: 84 MMHG | HEIGHT: 66 IN | SYSTOLIC BLOOD PRESSURE: 124 MMHG | OXYGEN SATURATION: 91 % | TEMPERATURE: 97 F | BODY MASS INDEX: 42.2 KG/M2 | RESPIRATION RATE: 16 BRPM

## 2020-12-18 DIAGNOSIS — J02.9 PHARYNGITIS, UNSPECIFIED ETIOLOGY: ICD-10-CM

## 2020-12-18 LAB
COVID ORDER STATUS COVID19: NORMAL
INT CON NEG: NORMAL
INT CON POS: NORMAL
S PYO AG THROAT QL: NEGATIVE

## 2020-12-18 PROCEDURE — U0003 INFECTIOUS AGENT DETECTION BY NUCLEIC ACID (DNA OR RNA); SEVERE ACUTE RESPIRATORY SYNDROME CORONAVIRUS 2 (SARS-COV-2) (CORONAVIRUS DISEASE [COVID-19]), AMPLIFIED PROBE TECHNIQUE, MAKING USE OF HIGH THROUGHPUT TECHNOLOGIES AS DESCRIBED BY CMS-2020-01-R: HCPCS

## 2020-12-18 PROCEDURE — 87880 STREP A ASSAY W/OPTIC: CPT | Performed by: PHYSICIAN ASSISTANT

## 2020-12-18 PROCEDURE — 99214 OFFICE O/P EST MOD 30 MIN: CPT | Performed by: PHYSICIAN ASSISTANT

## 2020-12-18 RX ORDER — ESCITALOPRAM OXALATE 20 MG/1
20 TABLET ORAL DAILY
COMMUNITY
Start: 2020-11-10

## 2020-12-18 ASSESSMENT — ENCOUNTER SYMPTOMS
HEADACHES: 0
CHILLS: 0
MYALGIAS: 0
ABDOMINAL PAIN: 0
SORE THROAT: 1
EYE PAIN: 0
DIARRHEA: 0
CONSTIPATION: 0
SHORTNESS OF BREATH: 0
NAUSEA: 0
COUGH: 1
VOMITING: 0
FEVER: 0

## 2020-12-18 ASSESSMENT — FIBROSIS 4 INDEX: FIB4 SCORE: 0.67

## 2020-12-18 NOTE — PROGRESS NOTES
Subjective:   Tae Banegas is a 40 y.o. female who presents for Pharyngitis (x 3 days, swollen tonsil, pain with swallowing, hx of strep throat)      HPI:  This is a otherwise healthy 40-year-old female with a history of strep pharyngitis presenting for around 4 days of a mild sore throat with very slight dysphagia to solids.  She notices right greater than left sore throat.  She has no known sick contacts.  She is had a mild runny nose and mild dry cough but that is very typical for her and her allergy symptoms.  She has been taking cough drops and other over-the-counter remedies appropriately.  She has not noticed any fevers or chills, body aches, or gastrointestinal symptoms.    Review of Systems   Constitutional: Negative for chills and fever.   HENT: Positive for congestion and sore throat. Negative for ear pain.    Eyes: Negative for pain.   Respiratory: Positive for cough. Negative for shortness of breath.    Cardiovascular: Negative for chest pain.   Gastrointestinal: Negative for abdominal pain, constipation, diarrhea, nausea and vomiting.   Genitourinary: Negative for dysuria.   Musculoskeletal: Negative for myalgias.   Skin: Negative for rash.   Neurological: Negative for headaches.       Medications:    • ALPRAZolam Tabs  • escitalopram  • escitalopram Tabs  • ibuprofen Tabs  • progesterone    Allergies: Penicillins    Problem List: Tae Banegas does not have a problem list on file.    Surgical History:  Past Surgical History:   Procedure Laterality Date   • ACL RECONSTRUCTION SCOPE  6/3/2010    Performed by AMANDA MOJICA at [facility] ORS   • GYN SURGERY  2001    right ovary removed for ovarian cyst   • DENTAL EXTRACTION(S)  2000    wisdom teeth        Past Social Hx: Tae Banegas  reports that she has never smoked. She has never used smokeless tobacco. She reports current alcohol use of about 3.5 oz of alcohol per week. She reports that she does not use drugs.     Past  "Family Hx:  Tae Banegas family history includes Cancer in her mother and another family member; Heart Disease in an other family member.     Problem list, medications, and allergies reviewed by myself today in Epic.     Objective:     /84 (BP Location: Right arm, Patient Position: Sitting, BP Cuff Size: Adult)   Pulse 97   Temp 36.1 °C (97 °F) (Temporal)   Resp 16   Ht 1.676 m (5' 6\")   Wt 119.1 kg (262 lb 9.6 oz)   SpO2 91%   BMI 42.38 kg/m²     Physical Exam  Vitals signs reviewed.   Constitutional:       Appearance: Normal appearance.   HENT:      Head: Normocephalic and atraumatic.      Right Ear: External ear normal.      Left Ear: External ear normal.      Nose: Congestion present.      Mouth/Throat:      Mouth: Mucous membranes are moist.      Comments: Mild posterior pharyngeal injection, no exudate, no tonsillar hypertrophy  Eyes:      Conjunctiva/sclera: Conjunctivae normal.   Cardiovascular:      Rate and Rhythm: Normal rate.   Pulmonary:      Effort: Pulmonary effort is normal.   Skin:     General: Skin is warm and dry.      Capillary Refill: Capillary refill takes less than 2 seconds.   Neurological:      Mental Status: She is alert and oriented to person, place, and time.         Lab Results/POC Test Results   Results for orders placed or performed in visit on 12/18/20   POCT Rapid Strep A   Result Value Ref Range    Rapid Strep Screen negative     Internal Control Positive Valid     Internal Control Negative Valid            Assessment/Plan:     Diagnosis and associated orders:     1. Pharyngitis, unspecified etiology  POCT Rapid Strep A    COVID/SARS CoV-2 PCR      Comments/MDM:     • Rapid strep is negative, her age as well as her reported history is unlikely for this to be a bacterial cause of pharyngitis.  I agree with the patient and that it would be wise to test for Covid due to the current pandemic although she does have very mild symptoms.  We discussed supportive care " measures.  She actually reported that she made the appointment yesterday when she was feeling worse and she is improved today.  We discussed return precautions including those for peritonsillar abscess versus retropharyngeal abscess process although I do not see any evidence of these currently on exam.  Patient's vital signs are reassuring and they appear hemodynamically stable and do not require higher level care at this time  I discussed self isolation and provided printed instructions (if applicable)  I discussed ER precautions and provided printed instructions (if applicable)  I educated the patient on possibility of a false-negative test and indications for repeat testing  I instructed the patient to try to follow up with their PCP (if applicable) for follow up care  I provided the patient the printed AVS which contains information about signing up for MyChart   I will contact the patient via [facility] with Covid results.  If requested, I provided the patient with a work note to provide to their employer or school regarding returning to work and discontinuation of self isolation.  All questions were answered and patient demonstrated verbal understanding of above.  I followed all reasonable PPE precautions during this encounter including but not limited to use of an N95 mask, gloves, and gown if indicated.             Differential diagnosis, natural history, supportive care, and indications for immediate follow-up discussed.    Advised the patient to follow-up with the primary care physician for recheck, reevaluation, and consideration of further management.    Please note that this dictation was created using voice recognition software. I have made a reasonable attempt to correct obvious errors, but I expect that there are errors of grammar and possibly content that I did not discover before finalizing the note.    This note was electronically signed by Saurabh Newsome PA-C

## 2020-12-19 LAB
SARS-COV-2 RNA RESP QL NAA+PROBE: DETECTED
SPECIMEN SOURCE: ABNORMAL

## 2021-04-20 ENCOUNTER — IMMUNIZATION (OUTPATIENT)
Dept: FAMILY PLANNING/WOMEN'S HEALTH CLINIC | Facility: IMMUNIZATION CENTER | Age: 41
End: 2021-04-20
Payer: COMMERCIAL

## 2021-04-20 DIAGNOSIS — Z23 ENCOUNTER FOR VACCINATION: Primary | ICD-10-CM

## 2021-04-20 PROCEDURE — 91300 PFIZER SARS-COV-2 VACCINE: CPT

## 2021-04-20 PROCEDURE — 0001A PFIZER SARS-COV-2 VACCINE: CPT

## 2021-05-14 ENCOUNTER — IMMUNIZATION (OUTPATIENT)
Dept: FAMILY PLANNING/WOMEN'S HEALTH CLINIC | Facility: IMMUNIZATION CENTER | Age: 41
End: 2021-05-14
Payer: COMMERCIAL

## 2021-05-14 DIAGNOSIS — Z23 ENCOUNTER FOR VACCINATION: Primary | ICD-10-CM

## 2021-05-14 PROCEDURE — 91300 PFIZER SARS-COV-2 VACCINE: CPT

## 2021-05-14 PROCEDURE — 0002A PFIZER SARS-COV-2 VACCINE: CPT

## 2021-05-22 ENCOUNTER — APPOINTMENT (OUTPATIENT)
Dept: RADIOLOGY | Facility: MEDICAL CENTER | Age: 41
End: 2021-05-22
Attending: EMERGENCY MEDICINE
Payer: COMMERCIAL

## 2021-05-22 ENCOUNTER — HOSPITAL ENCOUNTER (EMERGENCY)
Facility: MEDICAL CENTER | Age: 41
End: 2021-05-22
Attending: EMERGENCY MEDICINE
Payer: COMMERCIAL

## 2021-05-22 VITALS
BODY MASS INDEX: 40.39 KG/M2 | RESPIRATION RATE: 18 BRPM | DIASTOLIC BLOOD PRESSURE: 80 MMHG | OXYGEN SATURATION: 96 % | WEIGHT: 251.32 LBS | HEART RATE: 65 BPM | TEMPERATURE: 98 F | SYSTOLIC BLOOD PRESSURE: 140 MMHG | HEIGHT: 66 IN

## 2021-05-22 DIAGNOSIS — R51.9 NONINTRACTABLE HEADACHE, UNSPECIFIED CHRONICITY PATTERN, UNSPECIFIED HEADACHE TYPE: ICD-10-CM

## 2021-05-22 PROCEDURE — 70450 CT HEAD/BRAIN W/O DYE: CPT

## 2021-05-22 PROCEDURE — 96372 THER/PROPH/DIAG INJ SC/IM: CPT

## 2021-05-22 PROCEDURE — 96374 THER/PROPH/DIAG INJ IV PUSH: CPT

## 2021-05-22 PROCEDURE — 700111 HCHG RX REV CODE 636 W/ 250 OVERRIDE (IP): Performed by: EMERGENCY MEDICINE

## 2021-05-22 PROCEDURE — 99284 EMERGENCY DEPT VISIT MOD MDM: CPT

## 2021-05-22 PROCEDURE — 96375 TX/PRO/DX INJ NEW DRUG ADDON: CPT

## 2021-05-22 RX ORDER — SUMATRIPTAN 6 MG/.5ML
6 INJECTION, SOLUTION SUBCUTANEOUS ONCE
Status: COMPLETED | OUTPATIENT
Start: 2021-05-22 | End: 2021-05-22

## 2021-05-22 RX ORDER — SUMATRIPTAN 25 MG/1
25-100 TABLET, FILM COATED ORAL
Qty: 10 TABLET | Refills: 3 | Status: SHIPPED | OUTPATIENT
Start: 2021-05-22 | End: 2021-06-02

## 2021-05-22 RX ORDER — ONDANSETRON 2 MG/ML
10 INJECTION INTRAMUSCULAR; INTRAVENOUS ONCE
Status: DISCONTINUED | OUTPATIENT
Start: 2021-05-22 | End: 2021-05-22

## 2021-05-22 RX ORDER — ONDANSETRON 4 MG/1
4 TABLET, ORALLY DISINTEGRATING ORAL EVERY 8 HOURS PRN
Qty: 20 TABLET | Refills: 0 | Status: SHIPPED | OUTPATIENT
Start: 2021-05-22

## 2021-05-22 RX ORDER — DEXAMETHASONE SODIUM PHOSPHATE 4 MG/ML
4 INJECTION, SOLUTION INTRA-ARTICULAR; INTRALESIONAL; INTRAMUSCULAR; INTRAVENOUS; SOFT TISSUE ONCE
Status: DISCONTINUED | OUTPATIENT
Start: 2021-05-22 | End: 2021-05-22

## 2021-05-22 RX ORDER — PROCHLORPERAZINE EDISYLATE 5 MG/ML
10 INJECTION INTRAMUSCULAR; INTRAVENOUS ONCE
Status: COMPLETED | OUTPATIENT
Start: 2021-05-22 | End: 2021-05-22

## 2021-05-22 RX ORDER — ACETAMINOPHEN 500 MG
500-1000 TABLET ORAL EVERY 6 HOURS PRN
COMMUNITY

## 2021-05-22 RX ORDER — DEXAMETHASONE SODIUM PHOSPHATE 4 MG/ML
10 INJECTION, SOLUTION INTRA-ARTICULAR; INTRALESIONAL; INTRAMUSCULAR; INTRAVENOUS; SOFT TISSUE ONCE
Status: COMPLETED | OUTPATIENT
Start: 2021-05-22 | End: 2021-05-22

## 2021-05-22 RX ORDER — KETOROLAC TROMETHAMINE 30 MG/ML
15 INJECTION, SOLUTION INTRAMUSCULAR; INTRAVENOUS ONCE
Status: COMPLETED | OUTPATIENT
Start: 2021-05-22 | End: 2021-05-22

## 2021-05-22 RX ADMIN — PROCHLORPERAZINE EDISYLATE 10 MG: 5 INJECTION INTRAMUSCULAR; INTRAVENOUS at 15:08

## 2021-05-22 RX ADMIN — KETOROLAC TROMETHAMINE 15 MG: 30 INJECTION, SOLUTION INTRAMUSCULAR; INTRAVENOUS at 15:07

## 2021-05-22 RX ADMIN — SUMATRIPTAN 6 MG: 6 INJECTION, SOLUTION SUBCUTANEOUS at 15:08

## 2021-05-22 RX ADMIN — DEXAMETHASONE SODIUM PHOSPHATE 10 MG: 4 INJECTION, SOLUTION INTRA-ARTICULAR; INTRALESIONAL; INTRAMUSCULAR; INTRAVENOUS; SOFT TISSUE at 15:07

## 2021-05-22 NOTE — ED TRIAGE NOTES
"Presents complaining of frontal headache with light sensitivity, and episodic N/V recurring for the past 3 days.  She denies a hx of migraines.   Chief Complaint   Patient presents with   • Migraine   • Light Sensitivity   • N/V     /95   Pulse 74   Temp 36.4 °C (97.6 °F) (Temporal)   Resp 20   Ht 1.676 m (5' 6\")   Wt 114 kg (251 lb 5.2 oz)   LMP 05/21/2021 (Exact Date)   SpO2 95%   BMI 40.56 kg/m²      "

## 2021-05-22 NOTE — ED PROVIDER NOTES
"ED Provider Note    CHIEF COMPLAINT  Chief Complaint   Patient presents with   • Migraine   • Light Sensitivity   • N/V       HPI  Tae Banegas is a 40 y.o. female who presents stating that she has not had previous headaches, she reports the beginning of a headache on Thursday with throbbing nature in the frontal areas.  She denies any focal neurologic deficits.  She denies any neck stiffness.  She says she felt hot but has had no fever.  She has had no cough, then she had vomiting, she denies diarrhea.  She describes the headache as intermittent and comes and goes.  She describes the symptoms as severe.    REVIEW OF SYSTEMS  See HPI for further details. All other systems are negative.     PAST MEDICAL HISTORY   has a past medical history of Other specified symptom associated with female genital organs, Psychiatric disorder, and Unspecified disorder of menstruation and other abnormal bleeding from female genital tract.    SOCIAL HISTORY  Social History     Tobacco Use   • Smoking status: Never Smoker   • Smokeless tobacco: Never Used   Vaping Use   • Vaping Use: Never used   Substance and Sexual Activity   • Alcohol use: Yes     Alcohol/week: 3.5 oz     Types: 7 Standard drinks or equivalent per week     Comment: WEEKENDS   • Drug use: No   • Sexual activity: Not on file       SURGICAL HISTORY   has a past surgical history that includes gyn surgery (2001); dental extraction(s) (2000); and acl reconstruction scope (6/3/2010).    CURRENT MEDICATIONS  Home Medications     Reviewed by Perico Rondon (Pharmacy Tech) on 05/22/21 at 1445  Med List Status: Complete   Medication Last Dose Status   acetaminophen (TYLENOL) 500 MG Tab 5/22/2021 Active   escitalopram (LEXAPRO) 20 MG tablet 5/21/2021 Active                ALLERGIES  Allergies   Allergen Reactions   • Penicillins Rash       PHYSICAL EXAM  VITAL SIGNS: /75   Pulse 64   Temp 36.4 °C (97.6 °F) (Temporal)   Resp 20   Ht 1.676 m (5' 6\")   Wt 114 kg " (251 lb 5.2 oz)   LMP 05/21/2021 (Exact Date)   SpO2 95%   BMI 40.56 kg/m²  @[id number]::@   Pulse ox interpretation: I interpret this pulse ox as normal.  Constitutional: Alert in no apparent distress.  HENT: No signs of trauma, Bilateral external ears normal, Nose normal.   Eyes: Pupils are equal and reactive, Conjunctiva normal, Non-icteric.   Neck: Normal range of motion, No tenderness, Supple, No stridor.   Lymphatic: No lymphadenopathy noted.   Cardiovascular: Regular rate and rhythm, no murmurs.   Thorax & Lungs: Normal breath sounds, No respiratory distress, No wheezing, No chest tenderness.   Abdomen: Bowel sounds normal, Soft, No tenderness, No masses, No pulsatile masses. No peritoneal signs.  Skin: Warm, Dry, No erythema, No rash.   Back: No bony tenderness, No CVA tenderness.   Extremities: Intact distal pulses, No edema, No tenderness, No cyanosis.  Musculoskeletal: Good range of motion in all major joints. No tenderness to palpation or major deformities noted.   Neurologic: Alert , Normal motor function, Normal sensory function, No focal deficits noted.   Psychiatric: Affect normal, Judgment normal, Mood normal.       DIAGNOSTIC STUDIES / PROCEDURES        RADIOLOGY  CT-HEAD W/O   Final Result         1. No acute intracranial abnormality. No evidence of acute intracranial hemorrhage or mass lesion.                       COURSE & MEDICAL DECISION MAKING  Pertinent Labs & Imaging studies reviewed. (See chart for details)    The patient reports she has never had a head CT previously or other imaging of the brain.  I have ordered a head CT.  She does not appear to have meningitis or meningeal irritation.  She has no focal neurologic deficits.  Her neuro exam is normal.  The patient had an IV established, she was given dexamethasone 10 mg IV, sumatriptan 6 mg subcu, Compazine 10 mg IV, and Toradol 15 mg IV.      The patient's head CT is negative for acute disease. The patient presents with symptoms that  are not indicative of meningitis. She feels much improved after the treatment. She will be prescribed Compazine, Imitrex. She will follow-up with her doctor.     The patient will return for new or worsening symptoms and is stable at the time of discharge.    The patient is referred to a primary physician for blood pressure management, diabetic screening, and for all other preventative health concerns.        DISPOSITION:  Patient will be discharged home in stable condition.    FOLLOW UP:  [facility], Emergency Dept  [address]  [facility] [id number] [phone]    If symptoms worsen    Aime Mcdermott M.D.  [address]  [address]  [facility] [address]  [phone]      As needed    [facility]  [address] [address] [phone]    if these headaches continue      OUTPATIENT MEDICATIONS:  New Prescriptions    ONDANSETRON (ZOFRAN ODT) 4 MG TABLET DISPERSIBLE    Take 1 tablet by mouth every 8 hours as needed.    SUMATRIPTAN (IMITREX) 25 MG TAB TABLET    Take 1-4 Tablets by mouth one time as needed for Migraine for up to 1 dose.         The patient will return for worsening symptoms and is stable at the time of discharge. The patient verbalizes understanding and will comply.    FINAL IMPRESSION  1. Nonintractable headache, unspecified chronicity pattern, unspecified headache type                Electronically signed by: Jabari Villareal M.D., 5/22/2021 2:37 PM

## 2021-05-27 ENCOUNTER — HOSPITAL ENCOUNTER (OUTPATIENT)
Facility: MEDICAL CENTER | Age: 41
End: 2021-05-29
Attending: EMERGENCY MEDICINE | Admitting: FAMILY MEDICINE
Payer: COMMERCIAL

## 2021-05-27 ENCOUNTER — APPOINTMENT (OUTPATIENT)
Dept: RADIOLOGY | Facility: MEDICAL CENTER | Age: 41
End: 2021-05-27
Attending: EMERGENCY MEDICINE
Payer: COMMERCIAL

## 2021-05-27 DIAGNOSIS — F32.A ANXIETY AND DEPRESSION: ICD-10-CM

## 2021-05-27 DIAGNOSIS — R51.9 INTRACTABLE EPISODIC HEADACHE, UNSPECIFIED HEADACHE TYPE: ICD-10-CM

## 2021-05-27 DIAGNOSIS — R73.01 ELEVATED FASTING BLOOD SUGAR: ICD-10-CM

## 2021-05-27 DIAGNOSIS — E66.01 CLASS 3 SEVERE OBESITY DUE TO EXCESS CALORIES WITHOUT SERIOUS COMORBIDITY WITH BODY MASS INDEX (BMI) OF 40.0 TO 44.9 IN ADULT (HCC): ICD-10-CM

## 2021-05-27 DIAGNOSIS — R51.9 ACUTE INTRACTABLE HEADACHE, UNSPECIFIED HEADACHE TYPE: ICD-10-CM

## 2021-05-27 DIAGNOSIS — I67.841 REVERSIBLE CEREBROVASCULAR VASOCONSTRICTION SYNDROME: ICD-10-CM

## 2021-05-27 DIAGNOSIS — R00.1 BRADYCARDIA: ICD-10-CM

## 2021-05-27 DIAGNOSIS — D75.1 ERYTHROCYTOSIS: ICD-10-CM

## 2021-05-27 DIAGNOSIS — F41.9 ANXIETY AND DEPRESSION: ICD-10-CM

## 2021-05-27 LAB
ALBUMIN SERPL BCP-MCNC: 4.6 G/DL (ref 3.2–4.9)
ALBUMIN/GLOB SERPL: 1.5 G/DL
ALP SERPL-CCNC: 53 U/L (ref 30–99)
ALT SERPL-CCNC: 64 U/L (ref 2–50)
ANION GAP SERPL CALC-SCNC: 13 MMOL/L (ref 7–16)
AST SERPL-CCNC: 28 U/L (ref 12–45)
BASOPHILS # BLD AUTO: 0.5 % (ref 0–1.8)
BASOPHILS # BLD: 0.04 K/UL (ref 0–0.12)
BILIRUB SERPL-MCNC: 0.7 MG/DL (ref 0.1–1.5)
BUN SERPL-MCNC: 10 MG/DL (ref 8–22)
CALCIUM SERPL-MCNC: 9.5 MG/DL (ref 8.4–10.2)
CHLORIDE SERPL-SCNC: 100 MMOL/L (ref 96–112)
CO2 SERPL-SCNC: 26 MMOL/L (ref 20–33)
CREAT SERPL-MCNC: 0.62 MG/DL (ref 0.5–1.4)
EOSINOPHIL # BLD AUTO: 0.05 K/UL (ref 0–0.51)
EOSINOPHIL NFR BLD: 0.6 % (ref 0–6.9)
ERYTHROCYTE [DISTWIDTH] IN BLOOD BY AUTOMATED COUNT: 41.1 FL (ref 35.9–50)
EST. AVERAGE GLUCOSE BLD GHB EST-MCNC: 137 MG/DL
FLUAV RNA SPEC QL NAA+PROBE: NEGATIVE
FLUBV RNA SPEC QL NAA+PROBE: NEGATIVE
GLOBULIN SER CALC-MCNC: 3.1 G/DL (ref 1.9–3.5)
GLUCOSE SERPL-MCNC: 131 MG/DL (ref 65–99)
HBA1C MFR BLD: 6.4 % (ref 4–5.6)
HCG SERPL QL: NEGATIVE
HCT VFR BLD AUTO: 50.1 % (ref 37–47)
HGB BLD-MCNC: 17.2 G/DL (ref 12–16)
IMM GRANULOCYTES # BLD AUTO: 0.03 K/UL (ref 0–0.11)
IMM GRANULOCYTES NFR BLD AUTO: 0.4 % (ref 0–0.9)
LYMPHOCYTES # BLD AUTO: 1.71 K/UL (ref 1–4.8)
LYMPHOCYTES NFR BLD: 21.8 % (ref 22–41)
MCH RBC QN AUTO: 31.5 PG (ref 27–33)
MCHC RBC AUTO-ENTMCNC: 34.3 G/DL (ref 33.6–35)
MCV RBC AUTO: 91.8 FL (ref 81.4–97.8)
MONOCYTES # BLD AUTO: 0.51 K/UL (ref 0–0.85)
MONOCYTES NFR BLD AUTO: 6.5 % (ref 0–13.4)
NEUTROPHILS # BLD AUTO: 5.51 K/UL (ref 2–7.15)
NEUTROPHILS NFR BLD: 70.2 % (ref 44–72)
NRBC # BLD AUTO: 0 K/UL
NRBC BLD-RTO: 0 /100 WBC
PLATELET # BLD AUTO: 209 K/UL (ref 164–446)
PMV BLD AUTO: 10.7 FL (ref 9–12.9)
POTASSIUM SERPL-SCNC: 4.3 MMOL/L (ref 3.6–5.5)
PROT SERPL-MCNC: 7.7 G/DL (ref 6–8.2)
RBC # BLD AUTO: 5.46 M/UL (ref 4.2–5.4)
RSV RNA SPEC QL NAA+PROBE: NEGATIVE
SARS-COV-2 RNA RESP QL NAA+PROBE: NOTDETECTED
SODIUM SERPL-SCNC: 139 MMOL/L (ref 135–145)
SPECIMEN SOURCE: NORMAL
WBC # BLD AUTO: 7.9 K/UL (ref 4.8–10.8)

## 2021-05-27 PROCEDURE — 700111 HCHG RX REV CODE 636 W/ 250 OVERRIDE (IP): Performed by: EMERGENCY MEDICINE

## 2021-05-27 PROCEDURE — 80053 COMPREHEN METABOLIC PANEL: CPT

## 2021-05-27 PROCEDURE — 36415 COLL VENOUS BLD VENIPUNCTURE: CPT

## 2021-05-27 PROCEDURE — 96366 THER/PROPH/DIAG IV INF ADDON: CPT

## 2021-05-27 PROCEDURE — 700105 HCHG RX REV CODE 258: Performed by: FAMILY MEDICINE

## 2021-05-27 PROCEDURE — 84703 CHORIONIC GONADOTROPIN ASSAY: CPT

## 2021-05-27 PROCEDURE — G0378 HOSPITAL OBSERVATION PER HR: HCPCS

## 2021-05-27 PROCEDURE — 700102 HCHG RX REV CODE 250 W/ 637 OVERRIDE(OP): Performed by: PSYCHIATRY & NEUROLOGY

## 2021-05-27 PROCEDURE — 700102 HCHG RX REV CODE 250 W/ 637 OVERRIDE(OP): Performed by: FAMILY MEDICINE

## 2021-05-27 PROCEDURE — 99245 OFF/OP CONSLTJ NEW/EST HI 55: CPT | Performed by: PSYCHIATRY & NEUROLOGY

## 2021-05-27 PROCEDURE — 0241U HCHG SARS-COV-2 COVID-19 NFCT DS RESP RNA 4 TRGT MIC: CPT

## 2021-05-27 PROCEDURE — 99285 EMERGENCY DEPT VISIT HI MDM: CPT

## 2021-05-27 PROCEDURE — 70496 CT ANGIOGRAPHY HEAD: CPT

## 2021-05-27 PROCEDURE — 83036 HEMOGLOBIN GLYCOSYLATED A1C: CPT

## 2021-05-27 PROCEDURE — 85025 COMPLETE CBC W/AUTO DIFF WBC: CPT

## 2021-05-27 PROCEDURE — 700111 HCHG RX REV CODE 636 W/ 250 OVERRIDE (IP): Performed by: FAMILY MEDICINE

## 2021-05-27 PROCEDURE — A9270 NON-COVERED ITEM OR SERVICE: HCPCS | Performed by: PSYCHIATRY & NEUROLOGY

## 2021-05-27 PROCEDURE — 96375 TX/PRO/DX INJ NEW DRUG ADDON: CPT

## 2021-05-27 PROCEDURE — 96365 THER/PROPH/DIAG IV INF INIT: CPT

## 2021-05-27 PROCEDURE — A9270 NON-COVERED ITEM OR SERVICE: HCPCS | Performed by: FAMILY MEDICINE

## 2021-05-27 PROCEDURE — 99219 PR INITIAL OBSERVATION CARE,LEVL II: CPT | Performed by: FAMILY MEDICINE

## 2021-05-27 PROCEDURE — 700117 HCHG RX CONTRAST REV CODE 255: Performed by: EMERGENCY MEDICINE

## 2021-05-27 PROCEDURE — 96376 TX/PRO/DX INJ SAME DRUG ADON: CPT

## 2021-05-27 PROCEDURE — C9803 HOPD COVID-19 SPEC COLLECT: HCPCS | Performed by: EMERGENCY MEDICINE

## 2021-05-27 RX ORDER — HYDROMORPHONE HYDROCHLORIDE 1 MG/ML
0.5 INJECTION, SOLUTION INTRAMUSCULAR; INTRAVENOUS; SUBCUTANEOUS
Status: DISCONTINUED | OUTPATIENT
Start: 2021-05-27 | End: 2021-05-29 | Stop reason: HOSPADM

## 2021-05-27 RX ORDER — DIPHENHYDRAMINE HYDROCHLORIDE 50 MG/ML
25 INJECTION INTRAMUSCULAR; INTRAVENOUS ONCE
Status: COMPLETED | OUTPATIENT
Start: 2021-05-27 | End: 2021-05-27

## 2021-05-27 RX ORDER — SODIUM CHLORIDE, SODIUM LACTATE, POTASSIUM CHLORIDE, CALCIUM CHLORIDE 600; 310; 30; 20 MG/100ML; MG/100ML; MG/100ML; MG/100ML
INJECTION, SOLUTION INTRAVENOUS CONTINUOUS
Status: DISCONTINUED | OUTPATIENT
Start: 2021-05-27 | End: 2021-05-29 | Stop reason: HOSPADM

## 2021-05-27 RX ORDER — PROCHLORPERAZINE EDISYLATE 5 MG/ML
5-10 INJECTION INTRAMUSCULAR; INTRAVENOUS EVERY 4 HOURS PRN
Status: DISCONTINUED | OUTPATIENT
Start: 2021-05-27 | End: 2021-05-29 | Stop reason: HOSPADM

## 2021-05-27 RX ORDER — PROCHLORPERAZINE EDISYLATE 5 MG/ML
10 INJECTION INTRAMUSCULAR; INTRAVENOUS ONCE
Status: COMPLETED | OUTPATIENT
Start: 2021-05-27 | End: 2021-05-27

## 2021-05-27 RX ORDER — DIPHENHYDRAMINE HYDROCHLORIDE 50 MG/ML
12.5 INJECTION INTRAMUSCULAR; INTRAVENOUS ONCE
Status: COMPLETED | OUTPATIENT
Start: 2021-05-27 | End: 2021-05-27

## 2021-05-27 RX ORDER — POLYETHYLENE GLYCOL 3350 17 G/17G
1 POWDER, FOR SOLUTION ORAL
Status: DISCONTINUED | OUTPATIENT
Start: 2021-05-27 | End: 2021-05-29 | Stop reason: HOSPADM

## 2021-05-27 RX ORDER — SODIUM CHLORIDE 9 MG/ML
INJECTION, SOLUTION INTRAVENOUS CONTINUOUS
Status: DISCONTINUED | OUTPATIENT
Start: 2021-05-27 | End: 2021-05-27

## 2021-05-27 RX ORDER — ACETAMINOPHEN 325 MG/1
650 TABLET ORAL EVERY 6 HOURS PRN
Status: DISCONTINUED | OUTPATIENT
Start: 2021-05-27 | End: 2021-05-29 | Stop reason: HOSPADM

## 2021-05-27 RX ORDER — ONDANSETRON 4 MG/1
4 TABLET, ORALLY DISINTEGRATING ORAL EVERY 4 HOURS PRN
Status: DISCONTINUED | OUTPATIENT
Start: 2021-05-27 | End: 2021-05-29 | Stop reason: HOSPADM

## 2021-05-27 RX ORDER — KETOROLAC TROMETHAMINE 30 MG/ML
30 INJECTION, SOLUTION INTRAMUSCULAR; INTRAVENOUS ONCE
Status: COMPLETED | OUTPATIENT
Start: 2021-05-27 | End: 2021-05-27

## 2021-05-27 RX ORDER — PROMETHAZINE HYDROCHLORIDE 25 MG/1
12.5-25 TABLET ORAL EVERY 4 HOURS PRN
Status: DISCONTINUED | OUTPATIENT
Start: 2021-05-27 | End: 2021-05-29 | Stop reason: HOSPADM

## 2021-05-27 RX ORDER — ONDANSETRON 2 MG/ML
4 INJECTION INTRAMUSCULAR; INTRAVENOUS EVERY 4 HOURS PRN
Status: DISCONTINUED | OUTPATIENT
Start: 2021-05-27 | End: 2021-05-29 | Stop reason: HOSPADM

## 2021-05-27 RX ORDER — OXYCODONE HYDROCHLORIDE 10 MG/1
10 TABLET ORAL
Status: DISCONTINUED | OUTPATIENT
Start: 2021-05-27 | End: 2021-05-29 | Stop reason: HOSPADM

## 2021-05-27 RX ORDER — MORPHINE SULFATE 4 MG/ML
4 INJECTION, SOLUTION INTRAMUSCULAR; INTRAVENOUS ONCE
Status: COMPLETED | OUTPATIENT
Start: 2021-05-27 | End: 2021-05-27

## 2021-05-27 RX ORDER — AMOXICILLIN 250 MG
2 CAPSULE ORAL 2 TIMES DAILY
Status: DISCONTINUED | OUTPATIENT
Start: 2021-05-27 | End: 2021-05-29 | Stop reason: HOSPADM

## 2021-05-27 RX ORDER — ESCITALOPRAM OXALATE 10 MG/1
20 TABLET ORAL DAILY
Status: DISCONTINUED | OUTPATIENT
Start: 2021-05-27 | End: 2021-05-29 | Stop reason: HOSPADM

## 2021-05-27 RX ORDER — METOCLOPRAMIDE HYDROCHLORIDE 5 MG/ML
10 INJECTION INTRAMUSCULAR; INTRAVENOUS ONCE
Status: COMPLETED | OUTPATIENT
Start: 2021-05-27 | End: 2021-05-27

## 2021-05-27 RX ORDER — VERAPAMIL HYDROCHLORIDE 80 MG/1
40 TABLET ORAL EVERY 6 HOURS
Status: DISCONTINUED | OUTPATIENT
Start: 2021-05-27 | End: 2021-05-29 | Stop reason: HOSPADM

## 2021-05-27 RX ORDER — ENALAPRILAT 1.25 MG/ML
1.25 INJECTION INTRAVENOUS EVERY 6 HOURS PRN
Status: DISCONTINUED | OUTPATIENT
Start: 2021-05-27 | End: 2021-05-29 | Stop reason: HOSPADM

## 2021-05-27 RX ORDER — IBUPROFEN 200 MG
400-600 TABLET ORAL EVERY 6 HOURS PRN
COMMUNITY

## 2021-05-27 RX ORDER — MAGNESIUM SULFATE HEPTAHYDRATE 40 MG/ML
2 INJECTION, SOLUTION INTRAVENOUS ONCE
Status: COMPLETED | OUTPATIENT
Start: 2021-05-27 | End: 2021-05-27

## 2021-05-27 RX ORDER — SODIUM CHLORIDE 9 MG/ML
1000 INJECTION, SOLUTION INTRAVENOUS ONCE
Status: COMPLETED | OUTPATIENT
Start: 2021-05-27 | End: 2021-05-27

## 2021-05-27 RX ORDER — BISACODYL 10 MG
10 SUPPOSITORY, RECTAL RECTAL
Status: DISCONTINUED | OUTPATIENT
Start: 2021-05-27 | End: 2021-05-29 | Stop reason: HOSPADM

## 2021-05-27 RX ORDER — PROMETHAZINE HYDROCHLORIDE 25 MG/1
12.5-25 SUPPOSITORY RECTAL EVERY 4 HOURS PRN
Status: DISCONTINUED | OUTPATIENT
Start: 2021-05-27 | End: 2021-05-29 | Stop reason: HOSPADM

## 2021-05-27 RX ORDER — BUTALBITAL, ACETAMINOPHEN AND CAFFEINE 50; 325; 40 MG/1; MG/1; MG/1
1 TABLET ORAL EVERY 6 HOURS PRN
Status: DISCONTINUED | OUTPATIENT
Start: 2021-05-27 | End: 2021-05-29 | Stop reason: HOSPADM

## 2021-05-27 RX ORDER — OXYCODONE HYDROCHLORIDE 5 MG/1
5 TABLET ORAL
Status: DISCONTINUED | OUTPATIENT
Start: 2021-05-27 | End: 2021-05-29 | Stop reason: HOSPADM

## 2021-05-27 RX ORDER — CLONIDINE HYDROCHLORIDE 0.1 MG/1
0.1 TABLET ORAL EVERY 6 HOURS PRN
Status: DISCONTINUED | OUTPATIENT
Start: 2021-05-27 | End: 2021-05-29 | Stop reason: HOSPADM

## 2021-05-27 RX ADMIN — SODIUM CHLORIDE 1000 ML: 9 INJECTION, SOLUTION INTRAVENOUS at 14:32

## 2021-05-27 RX ADMIN — METOCLOPRAMIDE 10 MG: 5 INJECTION, SOLUTION INTRAMUSCULAR; INTRAVENOUS at 11:38

## 2021-05-27 RX ADMIN — OXYCODONE HYDROCHLORIDE 5 MG: 5 TABLET ORAL at 19:24

## 2021-05-27 RX ADMIN — DIPHENHYDRAMINE HYDROCHLORIDE 12.5 MG: 50 INJECTION, SOLUTION INTRAMUSCULAR; INTRAVENOUS at 07:30

## 2021-05-27 RX ADMIN — KETOROLAC TROMETHAMINE 30 MG: 30 INJECTION, SOLUTION INTRAMUSCULAR at 11:31

## 2021-05-27 RX ADMIN — ESCITALOPRAM OXALATE 20 MG: 10 TABLET ORAL at 14:31

## 2021-05-27 RX ADMIN — SODIUM CHLORIDE, POTASSIUM CHLORIDE, SODIUM LACTATE AND CALCIUM CHLORIDE: 600; 310; 30; 20 INJECTION, SOLUTION INTRAVENOUS at 14:33

## 2021-05-27 RX ADMIN — ACETAMINOPHEN 650 MG: 325 TABLET ORAL at 23:29

## 2021-05-27 RX ADMIN — SODIUM CHLORIDE, POTASSIUM CHLORIDE, SODIUM LACTATE AND CALCIUM CHLORIDE: 600; 310; 30; 20 INJECTION, SOLUTION INTRAVENOUS at 23:23

## 2021-05-27 RX ADMIN — PROCHLORPERAZINE EDISYLATE 10 MG: 5 INJECTION INTRAMUSCULAR; INTRAVENOUS at 07:19

## 2021-05-27 RX ADMIN — VERAPAMIL HYDROCHLORIDE 40 MG: 80 TABLET ORAL at 14:25

## 2021-05-27 RX ADMIN — DIPHENHYDRAMINE HYDROCHLORIDE 25 MG: 50 INJECTION INTRAMUSCULAR; INTRAVENOUS at 11:47

## 2021-05-27 RX ADMIN — BUTALBITAL, ACETAMINOPHEN AND CAFFEINE 1 TABLET: 50; 325; 40 TABLET ORAL at 17:54

## 2021-05-27 RX ADMIN — MAGNESIUM SULFATE 2 G: 2 INJECTION INTRAVENOUS at 11:41

## 2021-05-27 RX ADMIN — MORPHINE SULFATE 4 MG: 4 INJECTION INTRAVENOUS at 11:30

## 2021-05-27 RX ADMIN — IOHEXOL 100 ML: 350 INJECTION, SOLUTION INTRAVENOUS at 09:59

## 2021-05-27 ASSESSMENT — COGNITIVE AND FUNCTIONAL STATUS - GENERAL
DAILY ACTIVITIY SCORE: 24
SUGGESTED CMS G CODE MODIFIER MOBILITY: CH
MOBILITY SCORE: 24
SUGGESTED CMS G CODE MODIFIER DAILY ACTIVITY: CH

## 2021-05-27 ASSESSMENT — ENCOUNTER SYMPTOMS
FEVER: 0
CHILLS: 0
HEADACHES: 1
NECK PAIN: 0
NAUSEA: 1
COUGH: 0
TINGLING: 0
DIZZINESS: 0
EYE PAIN: 0
EYE REDNESS: 0
BLURRED VISION: 1
PHOTOPHOBIA: 1
FOCAL WEAKNESS: 0
VOMITING: 1
SHORTNESS OF BREATH: 0
HEARTBURN: 0
SENSORY CHANGE: 0

## 2021-05-27 ASSESSMENT — PAIN DESCRIPTION - PAIN TYPE
TYPE: ACUTE PAIN

## 2021-05-27 ASSESSMENT — LIFESTYLE VARIABLES
HAVE PEOPLE ANNOYED YOU BY CRITICIZING YOUR DRINKING: NO
TOTAL SCORE: 0
CONSUMPTION TOTAL: NEGATIVE
HOW MANY TIMES IN THE PAST YEAR HAVE YOU HAD 5 OR MORE DRINKS IN A DAY: 0
AVERAGE NUMBER OF DAYS PER WEEK YOU HAVE A DRINK CONTAINING ALCOHOL: 1
EVER FELT BAD OR GUILTY ABOUT YOUR DRINKING: NO
TOTAL SCORE: 0
TOTAL SCORE: 0
HAVE YOU EVER FELT YOU SHOULD CUT DOWN ON YOUR DRINKING: NO
ON A TYPICAL DAY WHEN YOU DRINK ALCOHOL HOW MANY DRINKS DO YOU HAVE: 2
EVER HAD A DRINK FIRST THING IN THE MORNING TO STEADY YOUR NERVES TO GET RID OF A HANGOVER: NO
ALCOHOL_USE: YES

## 2021-05-27 ASSESSMENT — PATIENT HEALTH QUESTIONNAIRE - PHQ9
SUM OF ALL RESPONSES TO PHQ9 QUESTIONS 1 AND 2: 0
2. FEELING DOWN, DEPRESSED, IRRITABLE, OR HOPELESS: NOT AT ALL
1. LITTLE INTEREST OR PLEASURE IN DOING THINGS: NOT AT ALL

## 2021-05-27 NOTE — ED PROVIDER NOTES
"ED Provider Note    CHIEF COMPLAINT  Chief Complaint   Patient presents with   • Migraine     Started last Thursday around 1400, seen here on Saturday for same concern, Pt stated \"It keeps flaring back up\";    • Nausea/Vomiting/Diarrhea     Started last Thursday and just hasnt resolved, LBM = this morning; Pt stated \"I took a zofran prior to coming here today\";       HPI  Tae Banegas is a 40 y.o. female who presents to the emergency department complaining of a headache.  The patient had abrupt onset of a headache started last Thursday 1 week ago.  This came on all of a sudden during intercourse.  Was severe 10 out of 10 pain.  Associated nausea and vomiting.  The headache is bitemporal and frontal and throbbing in nature.  She came into the ED 2 days later for persistent pain and felt better with the pain medicines had a negative CT and was discharged home.  Since that time the headaches wax and wane but is beginning crescendoed and worsen.  She again is complaining of severe pain.  This again in the same bifrontal temporal area.  She denies of nausea vomiting.  No fevers or chills.  No other acute concerns or complaints.  No fevers or chills.  No neck stiffness.  No focal numbness tingling or weakness.  No trauma.    REVIEW OF SYSTEMS  See HPI for further details. All other systems are negative.    PAST MEDICAL HISTORY  Past Medical History:   Diagnosis Date   • Other specified symptom associated with female genital organs     \"irregular periods\"   • Psychiatric disorder     anxiety - medicated, none currently   • Unspecified disorder of menstruation and other abnormal bleeding from female genital tract     was on medication, but not now       FAMILY HISTORY  Family History   Problem Relation Age of Onset   • Cancer Mother    • Heart Disease Other    • Cancer Other        SOCIAL HISTORY  Social History     Socioeconomic History   • Marital status: Single     Spouse name: Not on file   • Number of children: " Not on file   • Years of education: Not on file   • Highest education level: Not on file   Occupational History   • Not on file   Tobacco Use   • Smoking status: Never Smoker   • Smokeless tobacco: Never Used   Vaping Use   • Vaping Use: Never used   Substance and Sexual Activity   • Alcohol use: Yes     Alcohol/week: 3.5 oz     Types: 7 Standard drinks or equivalent per week     Comment: WEEKENDS   • Drug use: No   • Sexual activity: Not on file   Other Topics Concern   • Not on file   Social History Narrative   • Not on file     Social Determinants of Health     Financial Resource Strain:    • Difficulty of Paying Living Expenses:    Food Insecurity:    • Worried About Running Out of Food in the Last Year:    • Ran Out of Food in the Last Year:    Transportation Needs:    • Lack of Transportation (Medical):    • Lack of Transportation (Non-Medical):    Physical Activity:    • Days of Exercise per Week:    • Minutes of Exercise per Session:    Stress:    • Feeling of Stress :    Social Connections:    • Frequency of Communication with Friends and Family:    • Frequency of Social Gatherings with Friends and Family:    • Attends Sikhism Services:    • Active Member of Clubs or Organizations:    • Attends Club or Organization Meetings:    • Marital Status:    Intimate Partner Violence:    • Fear of Current or Ex-Partner:    • Emotionally Abused:    • Physically Abused:    • Sexually Abused:        SURGICAL HISTORY  Past Surgical History:   Procedure Laterality Date   • ACL RECONSTRUCTION SCOPE  6/3/2010    Performed by AMANDA MOJICA at [facility]S   • GYN SURGERY  2001    right ovary removed for ovarian cyst   • DENTAL EXTRACTION(S)  2000    wisdom teeth        CURRENT MEDICATIONS  Home Medications     Reviewed by Zach Osorio R.N. (Registered Nurse) on 05/27/21 at 0638  Med List Status: Partial   Medication Last Dose Status   acetaminophen (TYLENOL) 500 MG Tab  Active   escitalopram (LEXAPRO)  "20 MG tablet  Active   ondansetron (ZOFRAN ODT) 4 MG TABLET DISPERSIBLE 5/27/2021 Active   SUMAtriptan (IMITREX) 25 MG Tab tablet  Active                ALLERGIES  Allergies   Allergen Reactions   • Penicillins Rash       PHYSICAL EXAM  VITAL SIGNS: /69   Pulse (!) 53   Temp 36.6 °C (97.8 °F) (Temporal)   Resp 18   Ht 1.676 m (5' 6\")   Wt 113 kg (250 lb)   LMP 05/21/2021 (Exact Date)   SpO2 96%   Breastfeeding No   BMI 40.35 kg/m²    Constitutional: Awake alert nontoxic no acute distress.  Does appear uncomfortable  HENT: Normocephalic, Atraumatic, Bilateral external ears normal, no temporal tenderness.  Eyes: PERRL, EOMI, Conjunctiva normal, No discharge.   Neck: Normal range of motion.   Cardiovascular: Normal heart rate, Normal rhythm, No murmurs, No rubs, No gallops.   Thorax & Lungs: Normal breath sounds, No respiratory distress, No wheezing, No chest tenderness.   Abdomen: Bowel sounds normal, Soft, No tenderness  Skin: Warm, Dry, No erythema, No rash.   Back: No tenderness, No CVA tenderness.   Musculoskeletal: Good range of motion in all major joints  Neurologic: Alert, No focal deficits noted.   Psychiatric: Affect normal      Results for orders placed or performed during the hospital encounter of 05/27/21   CBC WITH DIFFERENTIAL   Result Value Ref Range    WBC 7.9 4.8 - 10.8 K/uL    RBC 5.46 (H) 4.20 - 5.40 M/uL    Hemoglobin 17.2 (H) 12.0 - 16.0 g/dL    Hematocrit 50.1 (H) 37.0 - 47.0 %    MCV 91.8 81.4 - 97.8 fL    MCH 31.5 27.0 - 33.0 pg    MCHC 34.3 33.6 - 35.0 g/dL    RDW 41.1 35.9 - 50.0 fL    Platelet Count 209 164 - 446 K/uL    MPV 10.7 9.0 - 12.9 fL    Neutrophils-Polys 70.20 44.00 - 72.00 %    Lymphocytes 21.80 (L) 22.00 - 41.00 %    Monocytes 6.50 0.00 - 13.40 %    Eosinophils 0.60 0.00 - 6.90 %    Basophils 0.50 0.00 - 1.80 %    Immature Granulocytes 0.40 0.00 - 0.90 %    Nucleated RBC 0.00 /100 WBC    Neutrophils (Absolute) 5.51 2.00 - 7.15 K/uL    Lymphs (Absolute) 1.71 1.00 - " 4.80 K/uL    Monos (Absolute) 0.51 0.00 - 0.85 K/uL    Eos (Absolute) 0.05 0.00 - 0.51 K/uL    Baso (Absolute) 0.04 0.00 - 0.12 K/uL    Immature Granulocytes (abs) 0.03 0.00 - 0.11 K/uL    NRBC (Absolute) 0.00 K/uL   COMP METABOLIC PANEL   Result Value Ref Range    Sodium 139 135 - 145 mmol/L    Potassium 4.3 3.6 - 5.5 mmol/L    Chloride 100 96 - 112 mmol/L    Co2 26 20 - 33 mmol/L    Anion Gap 13.0 7.0 - 16.0    Glucose 131 (H) 65 - 99 mg/dL    Bun 10 8 - 22 mg/dL    Creatinine 0.62 0.50 - 1.40 mg/dL    Calcium 9.5 8.4 - 10.2 mg/dL    AST(SGOT) 28 12 - 45 U/L    ALT(SGPT) 64 (H) 2 - 50 U/L    Alkaline Phosphatase 53 30 - 99 U/L    Total Bilirubin 0.7 0.1 - 1.5 mg/dL    Albumin 4.6 3.2 - 4.9 g/dL    Total Protein 7.7 6.0 - 8.2 g/dL    Globulin 3.1 1.9 - 3.5 g/dL    A-G Ratio 1.5 g/dL   HCG QUAL SERUM   Result Value Ref Range    Beta-Hcg Qualitative Serum Negative Negative   ESTIMATED GFR   Result Value Ref Range    GFR If African American >60 >60 mL/min/1.73 m 2    GFR If Non African American >60 >60 mL/min/1.73 m 2   COV-2, FLU A/B, AND RSV BY PCR (2-4 HOURS [employer]): Collect NP swab in VTM    Specimen: Respirate   Result Value Ref Range    SARS-CoV-2 Source NP Swab       CT-CTA HEAD WITH & W/O-POST PROCESS   Final Result      CT angiogram of the [place] within normal limits.            COURSE & MEDICAL DECISION MAKING  Pertinent Labs & Imaging studies reviewed. (See chart for details)  Chart was  reviewed from her previous visit.  The patient presents to the ED and second time in the last week for evaluation of a headache.  She had a thunderclap headache that happened last week.  She had negative head CT and was treated for migraine headache and discharged home in good condition.  She has been taking migraine therapy at home without relief.  She represents with a headache.  Differential diagnosis again includes migraine headache, cluster headache, venous thrombosis, ruptured aneurysm, or vasospastic type  headache.  The patient does not have evidence of meningitis.  Head CT did not show mass or tumor or large hemorrhage.  Subarachnoid hemorrhage could have been missed.    The patient is worked up with a CT and labs history of Compazine.  The Compazine had significant improvement in her headache but did not resolve her headache.  After a few minutes her headache has returned.  Head CT did not show an aneurysm.    Because of her persistent and worsening pain I think she requires hospitalist for further work-up and treatment.  I still think vasospasm and this thrombosis remain her differential diagnosis.  The patient be hospitalized for pain control and continued work-up and treatment and possible neurology consultation.  Discussed case with the hospitalist and care transfer at the time.          FINAL IMPRESSION  1. Acute intractable headache, unspecified headache type         2.   3.         Electronically signed by: Bob Bertrand M.D., 5/27/2021 7:00 AM

## 2021-05-27 NOTE — ED NOTES
"Chief Complaint   Patient presents with   • Migraine     Started last Thursday around 1400, seen here on Saturday for same concern, Pt stated \"It keeps flaring back up\";    • Nausea/Vomiting/Diarrhea     Started last Thursday and just hasnt resolved, LBM = this morning; Pt stated \"I took a zofran prior to coming here today\";     ED Triage Vitals [05/27/21 0633]   Enc Vitals Group      Blood Pressure 127/69      Pulse (Abnormal) 53      Respiration 18      Temperature 36.6 °C (97.8 °F)      Temp src Temporal      Pulse Oximetry 96 %      Weight 113 kg (250 lb)      Height 1.676 m (5' 6\")      Head Circumference       Peak Flow       Pain Score       Pain Loc       Pain Edu?       Excl. in GC?        "

## 2021-05-27 NOTE — CONSULTS
"Hospital Neurology Consult:    Referring Physician: Monalisa King M.D.    Reason for consultation: Headache    HPI: Tae Banegas is a 40 y.o. female with history of obesity, PCOS presenting to the hospital for headache and consulted for headache. Patient's headache began 1 week ago at 2pm. She was having sex at that time and after an orgasm had sudden onset of severe headache which she describes as \"getting hit in the head with a 2x4\". She had nausea and vomiting as well as photophobia. She presented to the E.D. 2 days later w/ headache an was dx w/ migraine. She was d/c after tx w/ pain medications and presented today w/o relief of headache. She had been taking tylenol, motrin, and imitrex w/o relief. At its worst the headache is a 10/10 and at its best it's 3-4/10. It's located on the bilateral forehead w/ radiation to the occiput at its worst and at times only on the occiput. Avoiding light improves it but the pain comes in waves. Headache has remained constant since its onset. She has no hx of migraines previously or headaches w/ sex. She has not had hx of migraines previously.     ROS:     As above. All other systems reviewed and are negative.    Past Medical History:    has a past medical history of Other specified symptom associated with female genital organs, Psychiatric disorder, and Unspecified disorder of menstruation and other abnormal bleeding from female genital tract.    FHx:  family history includes Cancer in her mother and another family member; Heart Disease in an other family member.    SHx:   reports that she has never smoked. She has never used smokeless tobacco. She reports current alcohol use of about 3.5 oz of alcohol per week. She reports that she does not use drugs.    Allergies:  Allergies   Allergen Reactions   • Penicillins Hives       Medications:    Current Facility-Administered Medications:   •  magnesium sulfate IVPB premix 2 g, 2 g, Intravenous, Once, Monalisa King M.D., " Last Rate: 25 mL/hr at 05/27/21 1141, 2 g at 05/27/21 1141  •  diphenhydrAMINE (BENADRYL) injection 25 mg, 25 mg, Intravenous, Once, Monalisa King M.D.  •  escitalopram (Lexapro) tablet 20 mg, 20 mg, Oral, DAILY, Monalisa King M.D.  •  senna-docusate (PERICOLACE or SENOKOT S) 8.6-50 MG per tablet 2 tablet, 2 tablet, Oral, BID **AND** polyethylene glycol/lytes (MIRALAX) PACKET 1 Packet, 1 Packet, Oral, QDAY PRN **AND** magnesium hydroxide (MILK OF MAGNESIA) suspension 30 mL, 30 mL, Oral, QDAY PRN **AND** bisacodyl (DULCOLAX) suppository 10 mg, 10 mg, Rectal, QDAY PRN, Monalisa King M.D.  •  enoxaparin (LOVENOX) inj 40 mg, 40 mg, Subcutaneous, DAILY, Monalisa King M.D.  •  acetaminophen (Tylenol) tablet 650 mg, 650 mg, Oral, Q6HRS PRN, Monalisa King M.D.  •  Notify provider if pain remains uncontrolled, , , CONTINUOUS **AND** Use the Numeric Rating Scale (NRS), Salgado-Baker Faces (WBF), or FLACC on regular floors and Critical-Care Pain Observation Tool (CPOT) on ICUs/Trauma to assess pain, , , CONTINUOUS **AND** Pulse Ox, , , CONTINUOUS **AND** Pharmacy Consult Request ...Pain Management Review 1 Each, 1 Each, Other, PHARMACY TO DOSE **AND** If patient difficult to arouse and/or has respiratory depression (respiratory rate of 10 or less), stop any opiates that are currently infusing and call a Rapid Response., , , CONTINUOUS, Monalisa King M.D.  •  oxyCODONE immediate-release (ROXICODONE) tablet 5 mg, 5 mg, Oral, Q3HRS PRN **OR** oxyCODONE immediate release (ROXICODONE) tablet 10 mg, 10 mg, Oral, Q3HRS PRN **OR** HYDROmorphone (Dilaudid) injection 0.5 mg, 0.5 mg, Intravenous, Q3HRS PRN, Monalisa King M.D.  •  cloNIDine (CATAPRES) tablet 0.1 mg, 0.1 mg, Oral, Q6HRS PRN, Monalisa King M.D.  •  enalaprilat (VASOTEC) injection 1.25 mg, 1.25 mg, Intravenous, Q6HRS PRN, Monalisa King M.D.  •  ondansetron (ZOFRAN) syringe/vial injection 4 mg, 4 mg, Intravenous, Q4HRS PRN, Monalisa King M.D.  •  ondansetron  (ZOFRAN ODT) dispertab 4 mg, 4 mg, Oral, Q4HRS PRN, Monalisa King M.D.  •  promethazine (PHENERGAN) tablet 12.5-25 mg, 12.5-25 mg, Oral, Q4HRS PRN, Monalisa King M.D.  •  promethazine (PHENERGAN) suppository 12.5-25 mg, 12.5-25 mg, Rectal, Q4HRS PRN, Monalisa King M.D.  •  prochlorperazine (COMPAZINE) injection 5-10 mg, 5-10 mg, Intravenous, Q4HRS PRN, Monalisa Kign M.D.  •  butalbital/apap/caffeine -40 mg (Fioricet) per tablet 1 tablet, 1 tablet, Oral, Q6HRS PRN, Monalisa King M.D.  •  lactated ringers infusion, , Intravenous, Continuous, Monalisa King M.D.    Current Outpatient Medications:   •  ibuprofen (MOTRIN) 200 MG Tab, Take 400-600 mg by mouth every 6 hours as needed for Headache., Disp: , Rfl:   •  Omega-3 Fatty Acids (FISH OIL PO), Take 1 tablet by mouth every day., Disp: , Rfl:   •  Ginkgo Biloba (GINKOBA PO), Take 1 tablet by mouth every day., Disp: , Rfl:   •  BIOTIN PO, Take 1 tablet by mouth every day., Disp: , Rfl:   •  VITAMIN D PO, Take 1 tablet by mouth every day., Disp: , Rfl:   •  acetaminophen (TYLENOL) 500 MG Tab, Take 500-1,000 mg by mouth every 6 hours as needed., Disp: , Rfl:   •  SUMAtriptan (IMITREX) 25 MG Tab tablet, Take 1-4 Tablets by mouth one time as needed for Migraine for up to 1 dose., Disp: 10 tablet, Rfl: 3  •  ondansetron (ZOFRAN ODT) 4 MG TABLET DISPERSIBLE, Take 1 tablet by mouth every 8 hours as needed., Disp: 20 tablet, Rfl: 0  •  escitalopram (LEXAPRO) 20 MG tablet, Take 20 mg by mouth every day., Disp: , Rfl:     Vitals:   Vitals:    05/27/21 0821 05/27/21 0851 05/27/21 0921 05/27/21 0951   BP: 116/61 112/58 116/68 124/64   Pulse: 65 (!) 57 (!) 56 77   Resp:       Temp:       TempSrc:       SpO2: 91% 92% 93% 92%   Weight:       Height:           Labs:     Lab Results   Component Value Date/Time    WBC 7.9 05/27/2021 06:40 AM    RBC 5.46 (H) 05/27/2021 06:40 AM    HEMOGLOBIN 17.2 (H) 05/27/2021 06:40 AM    HEMATOCRIT 50.1 (H) 05/27/2021 06:40 AM    MCV  91.8 05/27/2021 06:40 AM    MCH 31.5 05/27/2021 06:40 AM    MCHC 34.3 05/27/2021 06:40 AM    MPV 10.7 05/27/2021 06:40 AM    NEUTSPOLYS 70.20 05/27/2021 06:40 AM    LYMPHOCYTES 21.80 (L) 05/27/2021 06:40 AM    MONOCYTES 6.50 05/27/2021 06:40 AM    EOSINOPHILS 0.60 05/27/2021 06:40 AM    BASOPHILS 0.50 05/27/2021 06:40 AM      Lab Results   Component Value Date/Time    SODIUM 139 05/27/2021 06:40 AM    POTASSIUM 4.3 05/27/2021 06:40 AM    CHLORIDE 100 05/27/2021 06:40 AM    CO2 26 05/27/2021 06:40 AM    GLUCOSE 131 (H) 05/27/2021 06:40 AM    BUN 10 05/27/2021 06:40 AM    CREATININE 0.62 05/27/2021 06:40 AM    CREATININE 0.8 08/24/2008 08:30 PM      Lab Results   Component Value Date/Time    CHOLSTRLTOT 234 (H) 02/16/2019 09:38 AM     (H) 02/16/2019 09:38 AM    HDL 59 02/16/2019 09:38 AM    TRIGLYCERIDE 78 02/16/2019 09:38 AM       Lab Results   Component Value Date/Time    ALKPHOSPHAT 53 05/27/2021 06:40 AM    ASTSGOT 28 05/27/2021 06:40 AM    ALTSGPT 64 (H) 05/27/2021 06:40 AM    TBILIRUBIN 0.7 05/27/2021 06:40 AM      No results found for: TSH  Lab Results   Component Value Date/Time    FREET4 0.80 02/16/2019 09:38 AM     Lab Results   Component Value Date/Time    FREET3 3.95 02/16/2019 09:38 AM       Imaging/Testing:  CTA Head on 5/27/21 personally reviewed. I believe I appreciate some vasospasm diffusely.     CT Head W/O CST reviewed in chart.     Physical Exam:     General: 41 y/o female in bed in NAD  Cardio: Normal S1/S2. No peripheral edema.   Pulm: CTAX2. No respiratory distress.   Skin: Warm, dry, no rashes or lesions   Psychiatric: Appropriate affect. No active psychosis.  HEENT: Atraumatic head, normal sclera and conjunctiva, moist oral mucosa. No lid lag.  Abdomen: Soft, non tender. No masses or hepatosplenomegaly.    Neurologic:  Mental Status:  AAOx4. Able to follow commands/cross midline. Speech fluent/nondysarthric. Language functions intact. No neglect/apraxia.  Cranial Nerves:  PERRL. EOMi.  Face symmetric  Motor:  Normal muscle tone and bulk. Strength is 5/5 throughout. No abnormal movements.  Reflexes:  Deferred  Coordination:  Finger-nose w/o ataxia.   Sensation:  Normal to light touch throughout  Gait/Station:  Deferred    Assessment/Plan:    Tae Banegas is a 40 y.o. female with history of obesity, PCOS presenting to the hospital for headache and consulted for headache. The circumstances of the patient's headache raise suspicion for a vascular etiology, specifically RCVS or aneurysm. CTA Head did not show an aneurysm but I believe it demonstrates vasospasm which would be typical of RCVS. The circumstances (during sex/orgasm) are also favorable for this diagnosis. Currently exam is WNL. RCVS may take 3-4 weeks to improve on its own, however Ca channel blockers help with both symptoms and recovery.     Plan:  -Verapamil 80 mg TID  -MRI Brain W/O CST  -Acute headache tx per E.D./Internal Medicine.  -OK to use opioids for pain relief in this case.   -Plan discussed with consulting physician and patient's nurse.       Casimiro Kelly M.D., Diplomat of the American Board of Psychiatry and Neurology  Diplomat of ABPN Epilepsy Subspecialty   Assistant Clinical Professor, [facility] Neurology Consultant

## 2021-05-27 NOTE — ED NOTES
Pharmacy Medication Reconciliation      Medication reconciliation updated and complete per pt at bedside  Allergies have been verified and updated   No oral ABX within the last 14 days  Patient home pharmacy:[facility] ([place])

## 2021-05-27 NOTE — ASSESSMENT & PLAN NOTE
"-A1c 6.4%, patient showed be followed in the outpatient setting by PCP  - States she has \"hormone imbalances\" similar to PCOS  "

## 2021-05-27 NOTE — ASSESSMENT & PLAN NOTE
- DC'd on 5/22 after normal CT brain, resolution of symptoms with migraine cocktail  - Headache has recurred without resolution in ER with additional treatments  - Sounds migrainous in nature, will give IV Mag and Reglan in addition to another dose of Benadryl  - Given her R eye blurriness and headache, along with her body habitus, pseudotumor cerebri may be on the differential as well.  Voalte message sent to Dr Becka Kelly to evaluate whether pt may need LP to further workup or he believes this to be a typical migraine  - PRN fioricet  - Relates sumatriptan was ineffective at home  - CTA negative today, CT brain also negative on 5/22 ER visit

## 2021-05-27 NOTE — CARE PLAN
The patient is Stable - Low risk of patient condition declining or worsening    Shift Goals  Clinical Goals: Patient to verbalize plan of care (rest, IV fluid administration, MRI)    Progress made toward(s) clinical / shift goals:  Patient states shift goals for today understood.     Patient is not progressing towards the following goals:

## 2021-05-27 NOTE — ASSESSMENT & PLAN NOTE
Appears to have been chronic since 2019.  Possibly secondary to NYDIA or body habitus  Outpatient referral for sleep study

## 2021-05-27 NOTE — H&P
"Hospital Medicine History & Physical Note    Date of Service  5/27/2021    Primary Care Physician  Aime Mcdermott M.D.    Consultants  Neuro - Dr Casimiro Kelly    Code Status  Full Code    Chief Complaint  Chief Complaint   Patient presents with   • Migraine     Started last Thursday around 1400, seen here on Saturday for same concern, Pt stated \"It keeps flaring back up\";    • Nausea/Vomiting/Diarrhea     Started last Thursday and just hasnt resolved, LBM = this morning; Pt stated \"I took a zofran prior to coming here today\";       History of Presenting Illness  40 y.o. female who presented 5/27/2021 with headache. She states that she was having intercourse last Thursday and developed a severe headache during orgasm. She managed the headache for a day or two with Advil but had to come to the ER on the 22nd for uncontrolled pain, nausea and vomiting. She was given decadron 10mg, sumatriptan subq, compazine and Toradol with resolution of her symptoms, and discharged home. Since then, she has had nagging pain at the base of the skull, sometimes responsive to advil, sometimes not, and intermittent nausea and vomiting. Her headache returned again last night and she presented back here. She has received Toradol, Compazine, Benadryl and Morphine in the ER without resolution of her symptoms.  She denies neck stiffness, fever or other meningeal symptoms.  She does state that she has had intermittent blurriness of the R eye.    Review of Systems  Review of Systems   Constitutional: Negative for chills, fever and malaise/fatigue. Weight loss: R eye, intermittent.   HENT: Negative for ear pain and hearing loss.    Eyes: Positive for blurred vision and photophobia. Negative for pain and redness.   Respiratory: Negative for cough and shortness of breath.    Cardiovascular: Negative for chest pain and leg swelling.   Gastrointestinal: Positive for nausea and vomiting. Negative for heartburn.   Musculoskeletal: Negative " for neck pain.   Neurological: Positive for headaches. Negative for dizziness, tingling, sensory change and focal weakness.   All other systems reviewed and are negative.      Past Medical History   has a past medical history of Other specified symptom associated with female genital organs, Psychiatric disorder, and Unspecified disorder of menstruation and other abnormal bleeding from female genital tract.    Surgical History   has a past surgical history that includes gyn surgery (2001); dental extraction(s) (2000); and acl reconstruction scope (6/3/2010).     Family History  family history includes Cancer in her mother and another family member; Heart Disease in an other family member.     Social History   reports that she has never smoked. She has never used smokeless tobacco. She reports current alcohol use of about 3.5 oz of alcohol per week. She reports that she does not use drugs.    Allergies  Allergies   Allergen Reactions   • Penicillins Hives       Medications  Prior to Admission Medications   Prescriptions Last Dose Informant Patient Reported? Taking?   BIOTIN PO last week at [facility] Patient Yes Yes   Sig: Take 1 tablet by mouth every day.   Ginkgo Biloba (GINKOBA PO) last week at [facility] Patient Yes Yes   Sig: Take 1 tablet by mouth every day.   Omega-3 Fatty Acids (FISH OIL PO) last week at [facility] Patient Yes Yes   Sig: Take 1 tablet by mouth every day.   SUMAtriptan (IMITREX) 25 MG Tab tablet 5/26/2021 at pm Patient No No   Sig: Take 1-4 Tablets by mouth one time as needed for Migraine for up to 1 dose.   VITAMIN D PO last week at [facility] Patient Yes Yes   Sig: Take 1 tablet by mouth every day.   acetaminophen (TYLENOL) 500 MG Tab 5/27/2021 at 0500 Patient Yes No   Sig: Take 500-1,000 mg by mouth every 6 hours as needed.   escitalopram (LEXAPRO) 20 MG tablet 5/26/2021 at afternoon Patient Yes No   Sig: Take 20 mg by mouth every day.   ibuprofen (MOTRIN) 200 MG Tab 5/26/2021 at 2230 Patient Yes Yes   Sig: Take  400-600 mg by mouth every 6 hours as needed for Headache.   ondansetron (ZOFRAN ODT) 4 MG TABLET DISPERSIBLE 5/27/2021 at 0545 Patient No No   Sig: Take 1 tablet by mouth every 8 hours as needed.      Facility-Administered Medications: None       Physical Exam  Temp:  [36.6 °C (97.8 °F)] 36.6 °C (97.8 °F)  Pulse:  [44-77] 77  Resp:  [18] 18  BP: (112-138)/(58-71) 124/64  SpO2:  [91 %-96 %] 92 %    Physical Exam  Vitals and nursing note reviewed.   Constitutional:       Appearance: Normal appearance.   HENT:      Head: Normocephalic and atraumatic.      Right Ear: Tympanic membrane and ear canal normal.      Left Ear: Tympanic membrane and ear canal normal.      Mouth/Throat:      Mouth: Mucous membranes are moist.   Eyes:      General:         Right eye: No discharge.         Left eye: No discharge.      Extraocular Movements: Extraocular movements intact.      Pupils: Pupils are equal, round, and reactive to light.   Cardiovascular:      Rate and Rhythm: Regular rhythm. Bradycardia present.   Pulmonary:      Effort: Pulmonary effort is normal. No respiratory distress.      Breath sounds: Normal breath sounds. No wheezing, rhonchi or rales.   Abdominal:      General: Abdomen is flat. Bowel sounds are normal.      Palpations: Abdomen is soft.   Musculoskeletal:         General: No swelling.      Cervical back: Normal range of motion. No rigidity.   Skin:     Coloration: Skin is not jaundiced.   Neurological:      General: No focal deficit present.      Mental Status: She is alert and oriented to person, place, and time.   Psychiatric:         Mood and Affect: Mood normal.         Behavior: Behavior normal.         Laboratory:  Recent Labs     05/27/21  0640   WBC 7.9   RBC 5.46*   HEMOGLOBIN 17.2*   HEMATOCRIT 50.1*   MCV 91.8   MCH 31.5   MCHC 34.3   RDW 41.1   PLATELETCT 209   MPV 10.7     Recent Labs     05/27/21  0640   SODIUM 139   POTASSIUM 4.3   CHLORIDE 100   CO2 26   GLUCOSE 131*   BUN 10   CREATININE 0.62  "  CALCIUM 9.5     Recent Labs     05/27/21  0640   ALTSGPT 64*   ASTSGOT 28   ALKPHOSPHAT 53   TBILIRUBIN 0.7   GLUCOSE 131*         No results for input(s): NTPROBNP in the last 72 hours.      No results for input(s): TROPONINT in the last 72 hours.    Imaging:  CT-CTA HEAD WITH & W/O-POST PROCESS   Final Result      CT angiogram of the [place] within normal limits.            Assessment/Plan:  I anticipate this patient is appropriate for observation status at this time.    * Headache  Assessment & Plan  - DC'd on 5/22 after normal CT brain, resolution of symptoms with migraine cocktail  - Headache has recurred without resolution in ER with additional treatments  - Sounds migrainous in nature, will give IV Mag and Reglan in addition to another dose of Benadryl  - Given her R eye blurriness and headache, along with her body habitus, pseudotumor cerebri may be on the differential as well.  Voalte message sent to Dr Becka Kelly to evaluate whether pt may need LP to further workup or he believes this to be a typical migraine  - PRN fioricet  - Relates sumatriptan was ineffective at home  - CTA negative today, CT brain also negative on 5/22 ER visit     Elevated fasting blood sugar  Assessment & Plan  - Check A1c  - States she has \"hormone imbalances\" similar to PCOS    Anxiety and depression  Assessment & Plan  - Continue home Lexapro    Erythrocytosis  Assessment & Plan  Chronic since 2019 - ??? NYDIA given her habitus? Consider outpatient referral for sleep study      Bradycardia  Assessment & Plan  - Asymptomatic, avoid BB    Prophy: SCDs   "

## 2021-05-28 ENCOUNTER — APPOINTMENT (OUTPATIENT)
Dept: RADIOLOGY | Facility: MEDICAL CENTER | Age: 41
End: 2021-05-28
Attending: PSYCHIATRY & NEUROLOGY
Payer: COMMERCIAL

## 2021-05-28 DIAGNOSIS — I67.848 CEREBRAL ARTERY VASOSPASM: Primary | ICD-10-CM

## 2021-05-28 PROBLEM — E66.01 CLASS 3 SEVERE OBESITY IN ADULT (HCC): Status: ACTIVE | Noted: 2021-05-28

## 2021-05-28 PROBLEM — E66.813 CLASS 3 SEVERE OBESITY IN ADULT (HCC): Status: ACTIVE | Noted: 2021-05-28

## 2021-05-28 PROBLEM — I67.841 REVERSIBLE CEREBROVASCULAR VASOCONSTRICTION SYNDROME: Status: ACTIVE | Noted: 2021-05-28

## 2021-05-28 LAB
ANION GAP SERPL CALC-SCNC: 8 MMOL/L (ref 7–16)
BUN SERPL-MCNC: 9 MG/DL (ref 8–22)
CALCIUM SERPL-MCNC: 8.5 MG/DL (ref 8.4–10.2)
CHLORIDE SERPL-SCNC: 103 MMOL/L (ref 96–112)
CO2 SERPL-SCNC: 26 MMOL/L (ref 20–33)
CREAT SERPL-MCNC: 0.65 MG/DL (ref 0.5–1.4)
ERYTHROCYTE [DISTWIDTH] IN BLOOD BY AUTOMATED COUNT: 42.5 FL (ref 35.9–50)
GLUCOSE SERPL-MCNC: 118 MG/DL (ref 65–99)
HCT VFR BLD AUTO: 43.3 % (ref 37–47)
HGB BLD-MCNC: 14.2 G/DL (ref 12–16)
MAGNESIUM SERPL-MCNC: 2.2 MG/DL (ref 1.5–2.5)
MCH RBC QN AUTO: 31 PG (ref 27–33)
MCHC RBC AUTO-ENTMCNC: 32.8 G/DL (ref 33.6–35)
MCV RBC AUTO: 94.5 FL (ref 81.4–97.8)
PLATELET # BLD AUTO: 196 K/UL (ref 164–446)
PMV BLD AUTO: 10.2 FL (ref 9–12.9)
POTASSIUM SERPL-SCNC: 3.7 MMOL/L (ref 3.6–5.5)
RBC # BLD AUTO: 4.58 M/UL (ref 4.2–5.4)
SODIUM SERPL-SCNC: 137 MMOL/L (ref 135–145)
WBC # BLD AUTO: 7 K/UL (ref 4.8–10.8)

## 2021-05-28 PROCEDURE — 700102 HCHG RX REV CODE 250 W/ 637 OVERRIDE(OP): Performed by: FAMILY MEDICINE

## 2021-05-28 PROCEDURE — 99214 OFFICE O/P EST MOD 30 MIN: CPT | Performed by: PSYCHIATRY & NEUROLOGY

## 2021-05-28 PROCEDURE — 99226 PR SUBSEQUENT OBSERVATION CARE,LEVEL III: CPT | Performed by: INTERNAL MEDICINE

## 2021-05-28 PROCEDURE — 85027 COMPLETE CBC AUTOMATED: CPT

## 2021-05-28 PROCEDURE — G0378 HOSPITAL OBSERVATION PER HR: HCPCS

## 2021-05-28 PROCEDURE — A9270 NON-COVERED ITEM OR SERVICE: HCPCS | Performed by: FAMILY MEDICINE

## 2021-05-28 PROCEDURE — 70551 MRI BRAIN STEM W/O DYE: CPT

## 2021-05-28 PROCEDURE — 700105 HCHG RX REV CODE 258: Performed by: FAMILY MEDICINE

## 2021-05-28 PROCEDURE — A9270 NON-COVERED ITEM OR SERVICE: HCPCS | Performed by: PSYCHIATRY & NEUROLOGY

## 2021-05-28 PROCEDURE — 80048 BASIC METABOLIC PNL TOTAL CA: CPT

## 2021-05-28 PROCEDURE — 700102 HCHG RX REV CODE 250 W/ 637 OVERRIDE(OP): Performed by: PSYCHIATRY & NEUROLOGY

## 2021-05-28 PROCEDURE — 83735 ASSAY OF MAGNESIUM: CPT

## 2021-05-28 PROCEDURE — 36415 COLL VENOUS BLD VENIPUNCTURE: CPT

## 2021-05-28 RX ADMIN — ACETAMINOPHEN 650 MG: 325 TABLET ORAL at 12:51

## 2021-05-28 RX ADMIN — ACETAMINOPHEN 650 MG: 325 TABLET ORAL at 06:16

## 2021-05-28 RX ADMIN — ACETAMINOPHEN 650 MG: 325 TABLET ORAL at 19:57

## 2021-05-28 RX ADMIN — VERAPAMIL HYDROCHLORIDE 40 MG: 80 TABLET ORAL at 10:55

## 2021-05-28 RX ADMIN — OXYCODONE HYDROCHLORIDE 5 MG: 5 TABLET ORAL at 21:26

## 2021-05-28 RX ADMIN — VERAPAMIL HYDROCHLORIDE 40 MG: 80 TABLET ORAL at 17:34

## 2021-05-28 RX ADMIN — ESCITALOPRAM OXALATE 20 MG: 10 TABLET ORAL at 06:09

## 2021-05-28 RX ADMIN — SODIUM CHLORIDE, POTASSIUM CHLORIDE, SODIUM LACTATE AND CALCIUM CHLORIDE: 600; 310; 30; 20 INJECTION, SOLUTION INTRAVENOUS at 15:00

## 2021-05-28 ASSESSMENT — ENCOUNTER SYMPTOMS
HEADACHES: 1
DIZZINESS: 1

## 2021-05-28 ASSESSMENT — PAIN DESCRIPTION - PAIN TYPE
TYPE: ACUTE PAIN

## 2021-05-28 NOTE — PROGRESS NOTES
[facility] Neurology Progress Note:     Interval History: No acute events overnight. Complains of a dull headache this morning.     Objective:   Vitals:    05/27/21 1321 05/27/21 1420 05/28/21 0000 05/28/21 0500   BP: 109/53 132/62 107/48 110/58   Pulse: (!) 54 61  68   Resp:   18 18   Temp:  36.9 °C (98.4 °F)  36.6 °C (97.8 °F)   TempSrc:  Oral  Oral   SpO2: 89% 95%  94%   Weight:       Height:           Labs:        Lab Results   Component Value Date/Time    WBC 7.0 05/28/2021 03:51 AM    RBC 4.58 05/28/2021 03:51 AM    HEMOGLOBIN 14.2 05/28/2021 03:51 AM    HEMATOCRIT 43.3 05/28/2021 03:51 AM    MCV 94.5 05/28/2021 03:51 AM    MCH 31.0 05/28/2021 03:51 AM    MCHC 32.8 (L) 05/28/2021 03:51 AM    MPV 10.2 05/28/2021 03:51 AM    NEUTSPOLYS 70.20 05/27/2021 06:40 AM    LYMPHOCYTES 21.80 (L) 05/27/2021 06:40 AM    MONOCYTES 6.50 05/27/2021 06:40 AM    EOSINOPHILS 0.60 05/27/2021 06:40 AM    BASOPHILS 0.50 05/27/2021 06:40 AM      Lab Results   Component Value Date/Time    SODIUM 137 05/28/2021 03:51 AM    POTASSIUM 3.7 05/28/2021 03:51 AM    CHLORIDE 103 05/28/2021 03:51 AM    CO2 26 05/28/2021 03:51 AM    GLUCOSE 118 (H) 05/28/2021 03:51 AM    BUN 9 05/28/2021 03:51 AM    CREATININE 0.65 05/28/2021 03:51 AM    CREATININE 0.8 08/24/2008 08:30 PM      Lab Results   Component Value Date/Time    CHOLSTRLTOT 234 (H) 02/16/2019 09:38 AM     (H) 02/16/2019 09:38 AM    HDL 59 02/16/2019 09:38 AM    TRIGLYCERIDE 78 02/16/2019 09:38 AM       Lab Results   Component Value Date/Time    ALKPHOSPHAT 53 05/27/2021 06:40 AM    ASTSGOT 28 05/27/2021 06:40 AM    ALTSGPT 64 (H) 05/27/2021 06:40 AM    TBILIRUBIN 0.7 05/27/2021 06:40 AM        Imaging/Testing:   No new neuroimaging to review.     Physical Exam:      General: 41 y/o female in bed in NAD  Cardio: Normal S1/S2. No peripheral edema.   Pulm: CTAX2. No respiratory distress.   Skin: Warm, dry, no rashes or lesions   Psychiatric: Appropriate affect. No active  psychosis.  HEENT: Atraumatic head, normal sclera and conjunctiva, moist oral mucosa. No lid lag.  Abdomen: Soft, non tender. No masses or hepatosplenomegaly.     Neurologic:  Mental Status:  AAOx4. Able to follow commands/cross midline. Speech fluent/nondysarthric. Language functions intact. No neglect/apraxia.  Cranial Nerves:  PERRL. EOMi. Face symmetric  Motor:  Normal muscle tone and bulk. Strength is 5/5 throughout. No abnormal movements.  Reflexes:  Deferred  Coordination:  Finger-nose w/o ataxia.   Sensation:  Normal to light touch throughout  Gait/Station:  Deferred    Patient seen and examined on 5/28/21 and compared to 5/27/21 no significant clinical change was seen.     Assessment/Plan:    Tae Banegas is a 40 y.o. female with history of obesity, PCOS presenting to the hospital for headache and consulted for headache. Patient has RCVS by imaging. So far neurologic exam is WNL. Plan for MRI to see if there's silent ischemia. Reinforced to the patient that Verapamil was needed to maintain patency of her intracranial vasculature. Currently BP is tolerating however she had refused 2 doses due to this miscommunication.     Plan:  -Verapamil 40mg QID (down from 80mg) due to low SBP at baseline.  -MRI Brain W/O CST is pending.  -OK to use opioids for pain relief in this case.  -Should be monitored an additional 24 hours. If at that time she remains neurologically intact and headache is stable then she may be D/C w/ outpt Neurology f/u.  -Plan discussed with consulting physician and patient's nurse.     Casimiro Kelly M.D., Diplomat of the American Board of Psychiatry and Neurology  Diplomat of ABPN Epilepsy Subspecialty   Assistant Clinical Professor, [facility] Neurology Consultant

## 2021-05-28 NOTE — DIETARY
NUTRITION SERVICES: BMI - Pt with BMI >40 (=Body mass index is 40.35 kg/m².), Class III obesity. Weight loss counseling not appropriate in acute care setting. RECOMMEND - If appropriate at DC please refer to outpatient nutrition services for weight management.

## 2021-05-28 NOTE — ASSESSMENT & PLAN NOTE
- Discussed diet and lifestyle modifications with patient  - Patient will need to follow up with PCP for outpatient management

## 2021-05-28 NOTE — PROGRESS NOTES
"Assumed care of pt. A&O x 4, reports mild headache. Denies intervention. Pt reports \"leaky IV\". IV assessed and removed. New 20 G IV placed in L posterior hand. Pt denies nausea. No signs of distress. Pt understands POC. Bed low and locked, call light in reach.   "

## 2021-05-28 NOTE — CARE PLAN
The patient is Stable - Low risk of patient condition declining or worsening    Shift Goals  Clinical Goals: Pt's pain will remain at comfortable level    Progress made toward(s) clinical / shift goals:  Pt's headache was well-controlled by Tylenol so far.     Patient is not progressing towards the following goals:

## 2021-05-28 NOTE — DISCHARGE PLANNING
"Anticipated Discharge Disposition:   Home when medically cleared     Action:   Chart review complete.     Discussed patient's plan of care and plans for discharge during rounds.   Neurology is currently following this patient and per their note from 5/28, \"Should be monitored an additional 24 hours. If at that time she remains neurologically intact and headache is stable then she may be D/C w/ outpt Neurology f/u.\"     No anticipated needs upon discharge. RN CM will continue to follow and assist as needed.     Barriers to Discharge:   Medical Clearance/neurology clearance     Plan:   Wait for medical clearance   Hospital care management will continue to assist with discharge planning needs.     "

## 2021-05-28 NOTE — PROGRESS NOTES
"Hospital Medicine Daily Progress Note    Date of Service  5/28/2021    Chief Complaint  40 y.o. female admitted 5/27/2021 with headache    Hospital Course  Per notes, \"40 y.o. female who presented 5/27/2021 with headache. She states that she was having intercourse last Thursday and developed a severe headache during orgasm. She managed the headache for a day or two with Advil but had to come to the ER on the 22nd for uncontrolled pain, nausea and vomiting. She was given decadron 10mg, sumatriptan subq, compazine and Toradol with resolution of her symptoms, and discharged home. Since then, she has had nagging pain at the base of the skull, sometimes responsive to advil, sometimes not, and intermittent nausea and vomiting. Her headache returned again last night and she presented back here. She has received Toradol, Compazine, Benadryl and Morphine in the ER without resolution of her symptoms.  She denies neck stiffness, fever or other meningeal symptoms.  She does state that she has had intermittent blurriness of the R eye.\"    Patient was admitted and evaluated by neurology for severe headache.  There was initial concern for aneurysm or rCBF.  CTA of head was negative for aneurysm, but suggestive of vasospasm.  MRI of the head was negative for acute findings.  She was initiated on verapamil 80 mg 3 times daily.      Interval Problem Update  Patient was seen examined this morning at bedside, still having some headache but significantly improved.    Per neurology patient should be monitored for another 24 hours, if stable can be discharged.    Consultants/Specialty  Neurology    Code Status  Full Code    Disposition  Anticipated DC to home in 24 hours    Review of Systems  Review of Systems   Neurological: Positive for dizziness and headaches.   All other systems reviewed and are negative.       Physical Exam  Temp:  [36.6 °C (97.8 °F)-36.7 °C (98 °F)] 36.7 °C (98 °F)  Pulse:  [65-68] 65  Resp:  [18] 18  BP: " (107-113)/(48-58) 113/58  SpO2:  [94 %-95 %] 95 %    Physical Exam  Vitals and nursing note reviewed.   Constitutional:       Appearance: Normal appearance. She is not ill-appearing.   Cardiovascular:      Rate and Rhythm: Normal rate and regular rhythm.      Pulses: Normal pulses.      Heart sounds: Normal heart sounds.   Pulmonary:      Effort: Pulmonary effort is normal.      Breath sounds: Normal breath sounds.   Abdominal:      General: Abdomen is flat. Bowel sounds are normal.      Palpations: Abdomen is soft.   Musculoskeletal:      Right lower leg: No edema.      Left lower leg: No edema.   Neurological:      General: No focal deficit present.      Mental Status: She is alert and oriented to person, place, and time. Mental status is at baseline.      Cranial Nerves: No cranial nerve deficit.      Sensory: No sensory deficit.      Motor: No weakness.      Coordination: Coordination normal.      Gait: Gait normal.         Fluids    Intake/Output Summary (Last 24 hours) at 5/28/2021 1636  Last data filed at 5/28/2021 1448  Gross per 24 hour   Intake 6507 ml   Output --   Net 6507 ml       Laboratory  Recent Labs     05/27/21  0640 05/28/21  0351   WBC 7.9 7.0   RBC 5.46* 4.58   HEMOGLOBIN 17.2* 14.2   HEMATOCRIT 50.1* 43.3   MCV 91.8 94.5   MCH 31.5 31.0   MCHC 34.3 32.8*   RDW 41.1 42.5   PLATELETCT 209 196   MPV 10.7 10.2     Recent Labs     05/27/21  0640 05/28/21  0351   SODIUM 139 137   POTASSIUM 4.3 3.7   CHLORIDE 100 103   CO2 26 26   GLUCOSE 131* 118*   BUN 10 9   CREATININE 0.62 0.65   CALCIUM 9.5 8.5                   Imaging  MR-BRAIN-W/O   Final Result      MRI OF THE BRAIN WITHOUT CONTRAST WITHIN NORMAL LIMITS.      CT-CTA HEAD WITH & W/O-POST PROCESS   Final Result   Addendum 1 of 1   Addendum:   In reviewing the case there are some areas of smoothly contoured    narrowing, seen best right M2 level coronal image 28. In more distal    branches this is also suggested and shown best on the sagittal  "series.    These findings can be seen with reversible    cerebral vasoconstriction syndrome which is an often missed diagnosis in a    patient of this demographic.      Final      CT angiogram of the [place] within normal limits.           Assessment/Plan  * Headache  Assessment & Plan  - DC'd on 5/22 after normal CT brain, resolution of symptoms with migraine cocktail  - Headache has recurred without resolution in ER with additional treatments  - Sounds migrainous in nature, will give IV Mag and Reglan in addition to another dose of Benadryl  - Given her R eye blurriness and headache, along with her body habitus, pseudotumor cerebri may be on the differential as well.  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  Possibly secondary to NYDIA or body habitus  Outpatient referral for sleep study      Bradycardia  Assessment & Plan  - " Asymptomatic, avoid BB         VTE prophylaxis: ambulation, scds

## 2021-05-28 NOTE — ASSESSMENT & PLAN NOTE
Imaging and symptoms suggestive of RCVS CTA without evidence of aneurysm, MRI with no acute findings  Continue to monitor over next 24 hours  Continue with ropinirole  Neurology consulted, follow-up on further recommendations

## 2021-05-28 NOTE — PROGRESS NOTES
Pt AAOx4. Denies any pain or discomfort. Taught to inform RN if in need of assitance or if having any concerns. Pt verbalized understanding. Pt ambulating to the bathroom and back independently. Gait steady. Safety measures in place. No other needs at this time.

## 2021-05-28 NOTE — CARE PLAN
Problem: Pain - Standard  Goal: Alleviation of pain or a reduction in pain to the patient’s comfort goal  Outcome: Progressing     Problem: Knowledge Deficit - Standard  Goal: Patient and family/care givers will demonstrate understanding of plan of care, disease process/condition, diagnostic tests and medications  Outcome: Progressing   The patient is Stable - Low risk of patient condition declining or worsening    Shift Goals  Clinical Goals: Pt pain will remain at comfortable level    Progress made toward(s) clinical / shift goals:  Pt requested tylenol for headache. Pt reports tylenol worked. Pt remains hydrated by IV fluids.     Patient is not progressing towards the following goals: N/A

## 2021-05-28 NOTE — HOSPITAL COURSE
"Per notes, \"40 y.o. female who presented 5/27/2021 with headache. She states that she was having intercourse last Thursday and developed a severe headache during orgasm. She managed the headache for a day or two with Advil but had to come to the ER on the 22nd for uncontrolled pain, nausea and vomiting. She was given decadron 10mg, sumatriptan subq, compazine and Toradol with resolution of her symptoms, and discharged home. Since then, she has had nagging pain at the base of the skull, sometimes responsive to advil, sometimes not, and intermittent nausea and vomiting. Her headache returned again last night and she presented back here. She has received Toradol, Compazine, Benadryl and Morphine in the ER without resolution of her symptoms.  She denies neck stiffness, fever or other meningeal symptoms.  She does state that she has had intermittent blurriness of the R eye.\"    Patient was admitted and evaluated by neurology for severe headache.  There was initial concern for aneurysm or rCBF.  CTA of head was negative for aneurysm, but suggestive of vasospasm.  MRI of the head was negative for acute findings.  She was initiated on verapamil 80 mg 3 times daily.  "

## 2021-05-29 VITALS
DIASTOLIC BLOOD PRESSURE: 68 MMHG | RESPIRATION RATE: 18 BRPM | OXYGEN SATURATION: 95 % | TEMPERATURE: 98.1 F | SYSTOLIC BLOOD PRESSURE: 132 MMHG | WEIGHT: 250 LBS | BODY MASS INDEX: 40.18 KG/M2 | HEART RATE: 53 BPM | HEIGHT: 66 IN

## 2021-05-29 PROBLEM — R00.1 BRADYCARDIA: Status: RESOLVED | Noted: 2021-05-27 | Resolved: 2021-05-29

## 2021-05-29 PROCEDURE — 99217 PR OBSERVATION CARE DISCHARGE: CPT | Performed by: INTERNAL MEDICINE

## 2021-05-29 PROCEDURE — 700105 HCHG RX REV CODE 258: Performed by: FAMILY MEDICINE

## 2021-05-29 PROCEDURE — A9270 NON-COVERED ITEM OR SERVICE: HCPCS | Performed by: FAMILY MEDICINE

## 2021-05-29 PROCEDURE — 700102 HCHG RX REV CODE 250 W/ 637 OVERRIDE(OP): Performed by: PSYCHIATRY & NEUROLOGY

## 2021-05-29 PROCEDURE — A9270 NON-COVERED ITEM OR SERVICE: HCPCS | Performed by: PSYCHIATRY & NEUROLOGY

## 2021-05-29 PROCEDURE — G0378 HOSPITAL OBSERVATION PER HR: HCPCS

## 2021-05-29 PROCEDURE — 700102 HCHG RX REV CODE 250 W/ 637 OVERRIDE(OP): Performed by: FAMILY MEDICINE

## 2021-05-29 RX ORDER — OXYCODONE HYDROCHLORIDE 5 MG/1
5 TABLET ORAL EVERY 8 HOURS PRN
Qty: 15 TABLET | Refills: 0 | Status: SHIPPED | OUTPATIENT
Start: 2021-05-29 | End: 2021-06-03

## 2021-05-29 RX ORDER — VERAPAMIL HYDROCHLORIDE 40 MG/1
40 TABLET ORAL EVERY 6 HOURS
Qty: 120 TABLET | Refills: 0 | Status: SHIPPED | OUTPATIENT
Start: 2021-05-29 | End: 2021-06-02 | Stop reason: SDUPTHER

## 2021-05-29 RX ADMIN — ESCITALOPRAM OXALATE 20 MG: 10 TABLET ORAL at 06:15

## 2021-05-29 RX ADMIN — DOCUSATE SODIUM 50 MG AND SENNOSIDES 8.6 MG 2 TABLET: 8.6; 5 TABLET, FILM COATED ORAL at 06:15

## 2021-05-29 RX ADMIN — VERAPAMIL HYDROCHLORIDE 40 MG: 80 TABLET ORAL at 06:15

## 2021-05-29 RX ADMIN — ACETAMINOPHEN 650 MG: 325 TABLET ORAL at 06:15

## 2021-05-29 RX ADMIN — SODIUM CHLORIDE, POTASSIUM CHLORIDE, SODIUM LACTATE AND CALCIUM CHLORIDE: 600; 310; 30; 20 INJECTION, SOLUTION INTRAVENOUS at 02:35

## 2021-05-29 ASSESSMENT — PAIN DESCRIPTION - PAIN TYPE: TYPE: ACUTE PAIN

## 2021-05-29 NOTE — CARE PLAN
The patient is Stable - Low risk of patient condition declining or worsening    Shift Goals  Clinical Goals: pain management    Progress made toward(s) clinical / shift goals:  ambulates often    Patient is not progressing towards the following goals:

## 2021-05-29 NOTE — PROGRESS NOTES
Received report from night shift RN. Patient A/Ox4, denies SOB, N/V. Patient declined pain medication at this time. Bed in lowest, locked position, call light in reach. Patient denies further needs at this time.

## 2021-05-29 NOTE — DISCHARGE INSTRUCTIONS
Migraine Headache  A migraine headache is a very strong throbbing pain on one side or both sides of your head. This type of headache can also cause other symptoms. It can last from 4 hours to 3 days. Talk with your doctor about what things may bring on (trigger) this condition.  What are the causes?  The exact cause of this condition is not known. This condition may be triggered or caused by:  · Drinking alcohol.  · Smoking.  · Taking medicines, such as:  ? Medicine used to treat chest pain (nitroglycerin).  ? Birth control pills.  ? Estrogen.  ? Some blood pressure medicines.  · Eating or drinking certain products.  · Doing physical activity.  Other things that may trigger a migraine headache include:  · Having a menstrual period.  · Pregnancy.  · Hunger.  · Stress.  · Not getting enough sleep or getting too much sleep.  · Weather changes.  · Tiredness (fatigue).  What increases the risk?  · Being 25-55 years old.  · Being female.  · Having a family history of migraine headaches.  · Being .  · Having depression or anxiety.  · Being very overweight.  What are the signs or symptoms?  · A throbbing pain. This pain may:  ? Happen in any area of the head, such as on one side or both sides.  ? Make it hard to do daily activities.  ? Get worse with physical activity.  ? Get worse around bright lights or loud noises.  · Other symptoms may include:  ? Feeling sick to your stomach (nauseous).  ? Vomiting.  ? Dizziness.  ? Being sensitive to bright lights, loud noises, or smells.  · Before you get a migraine headache, you may get warning signs (an aura). An aura may include:  ? Seeing flashing lights or having blind spots.  ? Seeing bright spots, halos, or zigzag lines.  ? Having tunnel vision or blurred vision.  ? Having numbness or a tingling feeling.  ? Having trouble talking.  ? Having weak muscles.  · Some people have symptoms after a migraine headache (postdromal phase), such as:  ? Tiredness.  ? Trouble  thinking (concentrating).  How is this treated?  · Taking medicines that:  ? Relieve pain.  ? Relieve the feeling of being sick to your stomach.  ? Prevent migraine headaches.  · Treatment may also include:  ? Having acupuncture.  ? Avoiding foods that bring on migraine headaches.  ? Learning ways to control your body functions (biofeedback).  ? Therapy to help you know and deal with negative thoughts (cognitive behavioral therapy).  Follow these instructions at home:  Medicines  · Take over-the-counter and prescription medicines only as told by your doctor.  · Ask your doctor if the medicine prescribed to you:  ? Requires you to avoid driving or using heavy machinery.  ? Can cause trouble pooping (constipation). You may need to take these steps to prevent or treat trouble pooping:  § Drink enough fluid to keep your pee (urine) pale yellow.  § Take over-the-counter or prescription medicines.  § Eat foods that are high in fiber. These include beans, whole grains, and fresh fruits and vegetables.  § Limit foods that are high in fat and sugar. These include fried or sweet foods.  Lifestyle  · Do not drink alcohol.  · Do not use any products that contain nicotine or tobacco, such as cigarettes, e-cigarettes, and chewing tobacco. If you need help quitting, ask your doctor.  · Get at least 8 hours of sleep every night.  · Limit and deal with stress.  General instructions         · Keep a journal to find out what may bring on your migraine headaches. For example, write down:  ? What you eat and drink.  ? How much sleep you get.  ? Any change in what you eat or drink.  ? Any change in your medicines.  · If you have a migraine headache:  ? Avoid things that make your symptoms worse, such as bright lights.  ? It may help to lie down in a dark, quiet room.  ? Do not drive or use heavy machinery.  ? Ask your doctor what activities are safe for you.  · Keep all follow-up visits as told by your doctor. This is important.  Contact  a doctor if:  · You get a migraine headache that is different or worse than others you have had.  · You have more than 15 headache days in one month.  Get help right away if:  · Your migraine headache gets very bad.  · Your migraine headache lasts longer than 72 hours.  · You have a fever.  · You have a stiff neck.  · You have trouble seeing.  · Your muscles feel weak or like you cannot control them.  · You start to lose your balance a lot.  · You start to have trouble walking.  · You pass out (faint).  · You have a seizure.  Summary  · A migraine headache is a very strong throbbing pain on one side or both sides of your head. These headaches can also cause other symptoms.  · This condition may be treated with medicines and changes to your lifestyle.  · Keep a journal to find out what may bring on your migraine headaches.  · Contact a doctor if you get a migraine headache that is different or worse than others you have had.  · Contact your doctor if you have more than 15 headache days in a month.  This information is not intended to replace advice given to you by your health care provider. Make sure you discuss any questions you have with your health care provider.  Document Released: 09/26/2009 Document Revised: 04/10/2020 Document Reviewed: 01/30/2020  Elsevier Patient Education © 2020 [employer]  Headaches, Frequently Asked Questions  MIGRAINE HEADACHES  Q: What is migraine? What causes it? How can I treat it?  A: Generally, migraine headaches begin as a dull ache. Then they develop into a constant, throbbing, and pulsating pain. You may experience pain at the temples. You may experience pain at the front or back of one or both sides of the head. The pain is usually accompanied by a combination of:  · Nausea.   · Vomiting.   · Sensitivity to light and noise.   Some people (about 15%) experience an aura (see below) before an attack. The cause of migraine is believed to be chemical reactions in the brain.  "Treatment for migraine may include over-the-counter or prescription medications. It may also include self-help techniques. These include relaxation training and biofeedback.   Q: What is an aura?  A: About 15% of people with migraine get an \"aura\". This is a sign of neurological symptoms that occur before a migraine headache. You may see wavy or jagged lines, dots, or flashing lights. You might experience tunnel vision or blind spots in one or both eyes. The aura can include visual or auditory hallucinations (something imagined). It may include disruptions in smell (such as strange odors), taste or touch. Other symptoms include:  · Numbness.   · A \"pins and needles\" sensation.   · Difficulty in recalling or speaking the correct word.   These neurological events may last as long as 60 minutes. These symptoms will fade as the headache begins.  Q: What is a trigger?  A: Certain physical or environmental factors can lead to or \"trigger\" a migraine. These include:  · Foods.   · Hormonal changes.   · Weather.   · Stress.   It is important to remember that triggers are different for everyone. To help prevent migraine attacks, you need to figure out which triggers affect you. Keep a headache diary. This is a good way to track triggers. The diary will help you talk to your healthcare professional about your condition.  Q: Does weather affect migraines?  A: Bright sunshine, hot, humid conditions, and drastic changes in barometric pressure may lead to, or \"trigger,\" a migraine attack in some people. But studies have shown that weather does not act as a trigger for everyone with migraines.  Q: What is the link between migraine and hormones?  A: Hormones start and regulate many of your body's functions. Hormones keep your body in balance within a constantly changing environment. The levels of hormones in your body are unbalanced at times. Examples are during menstruation, pregnancy, or menopause. That can lead to a migraine " "attack. In fact, about three quarters of all women with migraine report that their attacks are related to the menstrual cycle.   Q: Is there an increased risk of stroke for migraine sufferers?  A: The likelihood of a migraine attack causing a stroke is very remote. That is not to say that migraine sufferers cannot have a stroke associated with their migraines. In persons under age 40, the most common associated factor for stroke is migraine headache. But over the course of a person's normal life span, the occurrence of migraine headache may actually be associated with a reduced risk of dying from cerebrovascular disease due to stroke.   Q: What are acute medications for migraine?  A: Acute medications are used to treat the pain of the headache after it has started. Examples over-the-counter medications, NSAIDs, ergots, and triptans.   Q: What are the triptans?  A: Triptans are the newest class of abortive medications. They are specifically targeted to treat migraine. Triptans are vasoconstrictors. They moderate some chemical reactions in the brain. The triptans work on receptors in your brain. Triptans help to restore the balance of a neurotransmitter called serotonin. Fluctuations in levels of serotonin are thought to be a main cause of migraine.   Q: Are over-the-counter medications for migraine effective?  A: Over-the-counter, or \"OTC,\" medications may be effective in relieving mild to moderate pain and associated symptoms of migraine. But you should see your caregiver before beginning any treatment regimen for migraine.   Q: What are preventive medications for migraine?  A: Preventive medications for migraine are sometimes referred to as \"prophylactic\" treatments. They are used to reduce the frequency, severity, and length of migraine attacks. Examples of preventive medications include antiepileptic medications, antidepressants, beta-blockers, calcium channel blockers, and NSAIDs (nonsteroidal anti-inflammatory " "drugs).  Q: Why are anticonvulsants used to treat migraine?  A: During the past few years, there has been an increased interest in antiepileptic drugs for the prevention of migraine. They are sometimes referred to as \"anticonvulsants\". Both epilepsy and migraine may be caused by similar reactions in the brain.   Q: Why are antidepressants used to treat migraine?  A: Antidepressants are typically used to treat people with depression. They may reduce migraine frequency by regulating chemical levels, such as serotonin, in the brain.   Q: What alternative therapies are used to treat migraine?  A: The term \"alternative therapies\" is often used to describe treatments considered outside the scope of conventional Western medicine. Examples of alternative therapy include acupuncture, acupressure, and yoga. Another common alternative treatment is herbal therapy. Some herbs are believed to relieve headache pain. Always discuss alternative therapies with your caregiver before proceeding. Some herbal products contain arsenic and other toxins.  TENSION HEADACHES  Q: What is a tension-type headache? What causes it? How can I treat it?  A: Tension-type headaches occur randomly. They are often the result of temporary stress, anxiety, fatigue, or anger. Symptoms include soreness in your temples, a tightening band-like sensation around your head (a \"vice-like\" ache). Symptoms can also include a pulling feeling, pressure sensations, and soha head and neck muscles. The headache begins in your forehead, temples, or the back of your head and neck. Treatment for tension-type headache may include over-the-counter or prescription medications. Treatment may also include self-help techniques such as relaxation training and biofeedback.  CLUSTER HEADACHES  Q: What is a cluster headache? What causes it? How can I treat it?  A: Cluster headache gets its name because the attacks come in groups. The pain arrives with little, if any, warning. " "It is usually on one side of the head. A tearing or bloodshot eye and a runny nose on the same side of the headache may also accompany the pain. Cluster headaches are believed to be caused by chemical reactions in the brain. They have been described as the most severe and intense of any headache type. Treatment for cluster headache includes prescription medication and oxygen.  SINUS HEADACHES  Q: What is a sinus headache? What causes it? How can I treat it?  A: When a cavity in the bones of the face and skull (a sinus) becomes inflamed, the inflammation will cause localized pain. This condition is usually the result of an allergic reaction, a tumor, or an infection. If your headache is caused by a sinus blockage, such as an infection, you will probably have a fever. An x-ray will confirm a sinus blockage. Your caregiver's treatment might include antibiotics for the infection, as well as antihistamines or decongestants.   REBOUND HEADACHES  Q: What is a rebound headache? What causes it? How can I treat it?  A: A pattern of taking acute headache medications too often can lead to a condition known as \"rebound headache.\" A pattern of taking too much headache medication includes taking it more than 2 days per week or in excessive amounts. That means more than the label or a caregiver advises. With rebound headaches, your medications not only stop relieving pain, they actually begin to cause headaches. Doctors treat rebound headache by tapering the medication that is being overused. Sometimes your caregiver will gradually substitute a different type of treatment or medication. Stopping may be a challenge. Regularly overusing a medication increases the potential for serious side effects. Consult a caregiver if you regularly use headache medications more than 2 days per week or more than the label advises.  ADDITIONAL QUESTIONS AND ANSWERS  Q: What is biofeedback?  A: Biofeedback is a self-help treatment. Biofeedback uses " special equipment to monitor your body's involuntary physical responses. Biofeedback monitors:  · Breathing.   · Pulse.   · Heart rate.   · Temperature.   · Muscle tension.   · Brain activity.   Biofeedback helps you refine and perfect your relaxation exercises. You learn to control the physical responses that are related to stress. Once the technique has been mastered, you do not need the equipment any more.  Q: Are headaches hereditary?  A: Four out of five (80%) of people that suffer report a family history of migraine. Scientists are not sure if this is genetic or a family predisposition. Despite the uncertainty, a child has a 50% chance of having migraine if one parent suffers. The child has a 75% chance if both parents suffer.   Q: Can children get headaches?  A: By the time they reach high school, most young people have experienced some type of headache. Many safe and effective approaches or medications can prevent a headache from occurring or stop it after it has begun.   Q: What type of doctor should I see to diagnose and treat my headache?  A: Start with your primary caregiver. Discuss his or her experience and approach to headaches. Discuss methods of classification, diagnosis, and treatment. Your caregiver may decide to recommend you to a headache specialist, depending upon your symptoms or other physical conditions. Having diabetes, allergies, etc., may require a more comprehensive and inclusive approach to your headache. The National Headache Foundation will provide, upon request, a list of NHF physician members in your state.  Document Released: 03/09/2005 Document Revised: 03/11/2013 Document Reviewed: 08/17/2009  [employer]® Patient Information ©2013 [employer].    Discharge Instructions    Discharged to home by car with friend. Discharged via wheelchair, hospital escort: Yes.  Special equipment needed: Not Applicable    Be sure to schedule a follow-up appointment with your primary care doctor or  any specialists as instructed.     Discharge Plan:   Diet Plan: Discussed  Activity Level: Discussed  Confirmed Follow up Appointment: Patient to Call and Schedule Appointment  Confirmed Symptoms Management: Discussed  Medication Reconciliation Updated: Yes    I understand that a diet low in cholesterol, fat, and sodium is recommended for good health. Unless I have been given specific instructions below for another diet, I accept this instruction as my diet prescription.   Other diet: Prior to arrival.     Special Instructions: None    · Is patient discharged on Warfarin / Coumadin?   No     Depression / Suicide Risk    As you are discharged from this [facility] facility, it is important to learn how to keep safe from harming yourself.    Recognize the warning signs:  · Abrupt changes in personality, positive or negative- including increase in energy   · Giving away possessions  · Change in eating patterns- significant weight changes-  positive or negative  · Change in sleeping patterns- unable to sleep or sleeping all the time   · Unwillingness or inability to communicate  · Depression  · Unusual sadness, discouragement and loneliness  · Talk of wanting to die  · Neglect of personal appearance   · Rebelliousness- reckless behavior  · Withdrawal from people/activities they love  · Confusion- inability to concentrate     If you or a loved one observes any of these behaviors or has concerns about self-harm, here's what you can do:  · Talk about it- your feelings and reasons for harming yourself  · Remove any means that you might use to hurt yourself (examples: pills, rope, extension cords, firearm)  · Get professional help from the community (Mental Health, Substance Abuse, psychological counseling)  · Do not be alone:Call your Safe Contact- someone whom you trust who will be there for you.  · Call your local CRISIS HOTLINE 680-1324 or 163-318-8028  · Call your local Children's Mobile Crisis Response Team [place] (555) 160-3581 or www.[place].POI  · Call the toll free National Suicide Prevention Hotlines   · National Suicide Prevention Lifeline 424-047-PRXP (9262)  · National [employer] Line Network 800-SUICIDE (730-4460)

## 2021-05-29 NOTE — PROGRESS NOTES
Report received. Assessment complete.  AAOx4. Patient calls appropriately.    Pt c/o pain to her head. PRN pain meds given with good results. No further interventions needed at this time. Will continue to monitor.     Pt denies N/V, SOB, or chest pain.    TAYLOR, ambulatory independently.    + void, LBM 5/26 PTA    Pt is tolerating reg diet.       Plan of care discussed with patient. Fall precautions in place. Belongings and call light within reach. Educated patient to call for assistance as needed. All needs met at this time.

## 2021-05-29 NOTE — CARE PLAN
The patient is Stable - Low risk of patient condition declining or worsening    Shift Goals  Clinical Goals: Pain management    Progress made toward(s) clinical / shift goals:  Patient did not report severe pain.     Patient is not progressing towards the following goals:N/A

## 2021-05-29 NOTE — DISCHARGE SUMMARY
"Discharge Summary    CHIEF COMPLAINT ON ADMISSION  Chief Complaint   Patient presents with   • Migraine     Started last Thursday around 1400, seen here on Saturday for same concern, Pt stated \"It keeps flaring back up\";    • Nausea/Vomiting/Diarrhea     Started last Thursday and just hasnt resolved, LBM = this morning; Pt stated \"I took a zofran prior to coming here today\";       Reason for Admission  Migraine     Admission Date  5/27/2021    CODE STATUS  Full Code    HPI & HOSPITAL COURSE  Per notes, \"40 y.o. female who presented 5/27/2021 with headache. She states that she was having intercourse last Thursday and developed a severe headache during orgasm. She managed the headache for a day or two with Advil but had to come to the ER on the 22nd for uncontrolled pain, nausea and vomiting. She was given decadron 10mg, sumatriptan subq, compazine and Toradol with resolution of her symptoms, and discharged home. Since then, she has had nagging pain at the base of the skull, sometimes responsive to advil, sometimes not, and intermittent nausea and vomiting. Her headache returned again last night and she presented back here. She has received Toradol, Compazine, Benadryl and Morphine in the ER without resolution of her symptoms.  She denies neck stiffness, fever or other meningeal symptoms.  She does state that she has had intermittent blurriness of the R eye.\"    Patient was admitted and evaluated by neurology for severe headache.  There was initial concern for aneurysm or RCVS.  CTA of head was negative for aneurysm, but suggestive of vasospasm.  MRI of the head was negative for acute findings.  She was initiated on verapamil 80 mg 3 times daily.  Patient was monitored for worsening of headache as recommended by neurology.  On day of discharge headache was persistent, but stable.  Symptoms and pain were maintained with current treatments of verapamil and oxycodone.  Patient was recommended to follow-up with neurology " in outpatient clinic.  I discussed all diagnosis, treatment plans in detail with patient.      Therefore, she is discharged in good and stable condition to home with close outpatient follow-up.    The patient recovered much more quickly than anticipated on admission.    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  Aime Mcdermott M.D.  [address]  [id number]  [place] [address]  [phone]    In 1 week  Follow up with neurologist as well.       MEDICATIONS ON DISCHARGE     Medication List      START taking these medications      Instructions   oxyCODONE immediate-release 5 MG Tabs  Commonly known as: ROXICODONE   Take 1 tablet by mouth every 8 hours as needed for up to 5 days.  Dose: 5 mg     verapamil 40 MG Tabs  Commonly known as: ISOPTIN   Take 1 tablet by mouth every 6 hours for 30 days.  Dose: 40 mg        CONTINUE taking these medications      Instructions   acetaminophen 500 MG Tabs  Commonly known as: TYLENOL   Take 500-1,000 mg by mouth every 6 hours as needed.  Dose: 500-1,000 mg     BIOTIN PO   Take 1 tablet by mouth every day.  Dose: 1 tablet     escitalopram 20 MG tablet  Commonly known as: LEXAPRO   Take 20 mg by mouth every day.  Dose: 20 mg     FISH OIL PO   Take 1 tablet by mouth every day.  Dose: 1 tablet     GINKOBA PO   Take 1 tablet by mouth every day.  Dose: 1 tablet     ibuprofen 200 MG Tabs  Commonly known as: MOTRIN   Take 400-600 mg by mouth every 6 hours as needed for Headache.  Dose: 400-600 mg     ondansetron 4 MG Tbdp  Commonly  known as: Zofran ODT   Take 1 tablet by mouth every 8 hours as needed.  Dose: 4 mg     SUMAtriptan 25 MG Tabs tablet  Commonly known as: IMITREX   Take 1-4 Tablets by mouth one time as needed for Migraine for up to 1 dose.  Dose:  mg     VITAMIN D PO   Take 1 tablet by mouth every day.  Dose: 1 tablet            Allergies  Allergies   Allergen Reactions   • Penicillins Hives       DIET  Orders Placed This Encounter   Procedures   • Diet Order Diet: Regular     Standing Status:   Standing     Number of Occurrences:   1     Order Specific Question:   Diet:     Answer:   Regular [1]       ACTIVITY  As tolerated.  Weight bearing as tolerated    CONSULTATIONS  Neurology    PROCEDURES  None    LABORATORY  Lab Results   Component Value Date    SODIUM 137 05/28/2021    POTASSIUM 3.7 05/28/2021    CHLORIDE 103 05/28/2021    CO2 26 05/28/2021    GLUCOSE 118 (H) 05/28/2021    BUN 9 05/28/2021    CREATININE 0.65 05/28/2021    CREATININE 0.8 08/24/2008        Lab Results   Component Value Date    WBC 7.0 05/28/2021    HEMOGLOBIN 14.2 05/28/2021    HEMATOCRIT 43.3 05/28/2021    PLATELETCT 196 05/28/2021        Total time of the discharge process exceeds 40 minutes.

## 2021-05-29 NOTE — PROGRESS NOTES
Discharging patient home per MD orders. Patient demonstrated understanding of discharge instructions and educational materials, as well as, follow up appointments, medications, and prescriptions. Patient is voiding without difficulty, pain is well controlled, patient did not report severe pain. O2 is greater than 90%. All questions have been answered. Patient has been discharged off the unit with a hospital escort.

## 2021-06-02 ENCOUNTER — OFFICE VISIT (OUTPATIENT)
Dept: NEUROLOGY | Facility: MEDICAL CENTER | Age: 41
End: 2021-06-02
Attending: PSYCHIATRY & NEUROLOGY
Payer: COMMERCIAL

## 2021-06-02 VITALS
SYSTOLIC BLOOD PRESSURE: 118 MMHG | HEART RATE: 91 BPM | DIASTOLIC BLOOD PRESSURE: 78 MMHG | BODY MASS INDEX: 40.78 KG/M2 | WEIGHT: 253.75 LBS | OXYGEN SATURATION: 95 % | TEMPERATURE: 97 F | HEIGHT: 66 IN

## 2021-06-02 DIAGNOSIS — I67.841 REVERSIBLE CEREBROVASCULAR VASOCONSTRICTION SYNDROME: Primary | ICD-10-CM

## 2021-06-02 PROCEDURE — 99212 OFFICE O/P EST SF 10 MIN: CPT | Performed by: PSYCHIATRY & NEUROLOGY

## 2021-06-02 PROCEDURE — 99215 OFFICE O/P EST HI 40 MIN: CPT | Performed by: PSYCHIATRY & NEUROLOGY

## 2021-06-02 RX ORDER — VERAPAMIL HYDROCHLORIDE 40 MG/1
80 TABLET ORAL EVERY 8 HOURS
Qty: 540 TABLET | Refills: 0 | Status: SHIPPED | OUTPATIENT
Start: 2021-06-02 | End: 2021-09-03

## 2021-06-02 ASSESSMENT — FIBROSIS 4 INDEX: FIB4 SCORE: .6785714285714285714

## 2021-06-02 NOTE — PROGRESS NOTES
"Chief Complaint   Patient presents with   • New Patient     Cerebral artery vasospasm       History of present illness:  Tae Banegas 40 y.o. female with PCOS presented to the hospital for headache on 5/27, found to have right M2 narrowing on CTA head, diagnosed with RCVS and discharged with verapamil 80mg TID.      She presented to the ED on 5/22 for 8 hours of throbbing headache and vomiting after having sex and she was prescribed sumatriptan. She took 2 tabs of sumatriptan twice however the headache remained for a week. She was using marijuana for sleep on weekends.   She currently is on verapamil and her headache is improved.   She uses ibuprofen and tylenol every 6 hours as well for headache prevention and she is .   She has been on escitalopram for 5+ years for depression.   She is not on oral contraceptive pills and is not pregnant.     Past medical history:   Past Medical History:   Diagnosis Date   • Other specified symptom associated with female genital organs     \"irregular periods\"   • Psychiatric disorder     anxiety - medicated, none currently   • Unspecified disorder of menstruation and other abnormal bleeding from female genital tract     was on medication, but not now       Past surgical history:   Past Surgical History:   Procedure Laterality Date   • ACL RECONSTRUCTION SCOPE  6/3/2010    Performed by AMADNA MOJICA at SURGERY [facility] ORS   • GYN SURGERY  2001    right ovary removed for ovarian cyst   • DENTAL EXTRACTION(S)  2000    wisdom teeth        Family history:   Family History   Problem Relation Age of Onset   • Cancer Mother    • Heart Disease Other    • Cancer Other        Social history:   Social History     Socioeconomic History   • Marital status: Single     Spouse name: Not on file   • Number of children: Not on file   • Years of education: Not on file   • Highest education level: Not on file   Occupational History   • Not on file   Tobacco Use   • Smoking status: Never " Smoker   • Smokeless tobacco: Never Used   Vaping Use   • Vaping Use: Never used   Substance and Sexual Activity   • Alcohol use: Yes     Alcohol/week: 3.5 oz     Types: 7 Standard drinks or equivalent per week     Comment: WEEKENDS   • Drug use: No   • Sexual activity: Not on file   Other Topics Concern   • Not on file   Social History Narrative   • Not on file     Social Determinants of Health     Financial Resource Strain:    • Difficulty of Paying Living Expenses:    Food Insecurity:    • Worried About Running Out of Food in the Last Year:    • Ran Out of Food in the Last Year:    Transportation Needs:    • Lack of Transportation (Medical):    • Lack of Transportation (Non-Medical):    Physical Activity:    • Days of Exercise per Week:    • Minutes of Exercise per Session:    Stress:    • Feeling of Stress :    Social Connections:    • Frequency of Communication with Friends and Family:    • Frequency of Social Gatherings with Friends and Family:    • Attends [employer] Services:    • Active Member of Clubs or Organizations:    • Attends Club or Organization Meetings:    • Marital Status:    Intimate Partner Violence:    • Fear of Current or Ex-Partner:    • Emotionally Abused:    • Physically Abused:    • Sexually Abused:        Current medications:   Current Outpatient Medications   Medication   • verapamil (ISOPTIN) 40 MG Tab   • oxyCODONE immediate-release (ROXICODONE) 5 MG Tab   • ibuprofen (MOTRIN) 200 MG Tab   • Omega-3 Fatty Acids (FISH OIL PO)   • Ginkgo Biloba (GINKOBA PO)   • BIOTIN PO   • VITAMIN D PO   • acetaminophen (TYLENOL) 500 MG Tab   • ondansetron (ZOFRAN ODT) 4 MG TABLET DISPERSIBLE   • escitalopram (LEXAPRO) 20 MG tablet     No current facility-administered medications for this visit.       Medication Allergy:  Allergies   Allergen Reactions   • Penicillins Hives       Physical examination:   Vitals:    06/02/21 0709   BP: 118/78   BP Location: Left arm   Pulse: 91   Temp: 36.1 °C (97 °F)  "  TempSrc: Temporal   SpO2: 95%   Weight: 115 kg (253 lb 12 oz)   Height: 1.676 m (5' 6\")     Neurological Exam  Mental Status  Awake and alert. Speech is normal. Language is fluent with no aphasia.    Cranial Nerves  CN II: Right funduscopic exam: disc intact. Left funduscopic exam: disc intact.  CN III, IV, VI: Extraocular movements intact bilaterally. No nystagmus. Normal smooth pursuit.    Motor                                               Right                     Left   Shoulder abduction               5                          5  Elbow flexion                         5                          5  Elbow extension                    5                          5  Wrist extension                      5                          5  Hip abduction                         5                          5  Knee flexion                           5                          5  Knee extension                      5                          5  Dorsiflexion                            5                          5    Sensory  Light touch is normal in upper and lower extremities.     Coordination  Right: Finger-to-nose normal. Rapid alternating movement normal.  Left: Finger-to-nose normal. Rapid alternating movement normal.    Gait  Casual gait is normal including stance, stride, and arm swing. Normal tandem gait.      Labs:  None     Imagin21 CTA HEAD:  In reviewing the case there are some areas of smoothly contoured narrowing, seen best right M2 level coronal image 28. In more distal branches this is also suggested and shown best on the sagittal series. These findings can be seen with reversible cerebral vasoconstriction syndrome which is an often missed diagnosis in a patient of this demographic.    21 MRI BRAIN W/O:   I reviewed the images personally and agree with the following read:   Normal    ASSESSMENT AND PLAN:  Problem List Items Addressed This Visit     Reversible cerebrovascular vasoconstriction " syndrome - Primary    Relevant Medications    verapamil (ISOPTIN) 40 MG Tab    Other Relevant Orders    CT-CTA HEAD WITH & W/O-POST PROCESS          1. Reversible cerebrovascular vasoconstriction syndrome  - CT-CTA HEAD WITH & W/O-POST PROCESS; Future  - verapamil (ISOPTIN) 40 MG Tab; Take 2 Tablets by mouth every 8 hours for 90 days.  Dispense: 540 tablet; Refill: 0  This is a 41 y/o female with right M2 narrowing on CTA, diagnosed with RCVS. She is on escitalopram but has been on a stable dose for 5+ years, therefore I doubt that this is the triggering factor here. She has been counseled to avoid triptans. I will consider discontinuing this if headaches persist.   I have continued her verapamil 80mg 3x/day. She will have a repeat CTA head in 3 months and I will D/C verapamil then if the vasoconstriction is resolved.     FOLLOW-UP:   Return if symptoms worsen or fail to improve.    Total time spent for the day for this patient unrelated to procedure time is: 42 minutes. I spent 27 minutes in face to face time and I spent 8 minutes pre-charting and 7 minutes in post-visit documentation.      Dr. Chris Fong D.O.  [facility]   Movement Disorders Specialist

## 2021-06-02 NOTE — PATIENT INSTRUCTIONS
Avoid using triptans (sumatriptan) for treatment of headache.   Avoid marijuana, stimulants, intense exercise, sex for a few weeks.

## 2021-06-07 ENCOUNTER — TELEPHONE (OUTPATIENT)
Dept: NEUROLOGY | Facility: MEDICAL CENTER | Age: 41
End: 2021-06-07

## 2021-09-02 ENCOUNTER — APPOINTMENT (OUTPATIENT)
Dept: RADIOLOGY | Facility: MEDICAL CENTER | Age: 41
End: 2021-09-02
Attending: PSYCHIATRY & NEUROLOGY
Payer: COMMERCIAL

## 2021-09-03 ENCOUNTER — PATIENT MESSAGE (OUTPATIENT)
Dept: NEUROLOGY | Facility: MEDICAL CENTER | Age: 41
End: 2021-09-03

## 2022-08-19 ENCOUNTER — HOSPITAL ENCOUNTER (OUTPATIENT)
Dept: LAB | Facility: MEDICAL CENTER | Age: 42
End: 2022-08-19
Attending: FAMILY MEDICINE
Payer: COMMERCIAL

## 2022-08-19 LAB
ALBUMIN SERPL BCP-MCNC: 4.3 G/DL (ref 3.2–4.9)
ALBUMIN/GLOB SERPL: 1.3 G/DL
ALP SERPL-CCNC: 64 U/L (ref 30–99)
ALT SERPL-CCNC: 84 U/L (ref 2–50)
ANION GAP SERPL CALC-SCNC: 12 MMOL/L (ref 7–16)
AST SERPL-CCNC: 46 U/L (ref 12–45)
BILIRUB SERPL-MCNC: 0.5 MG/DL (ref 0.1–1.5)
BUN SERPL-MCNC: 12 MG/DL (ref 8–22)
CALCIUM SERPL-MCNC: 9.3 MG/DL (ref 8.5–10.5)
CHLORIDE SERPL-SCNC: 103 MMOL/L (ref 96–112)
CHOLEST SERPL-MCNC: 236 MG/DL (ref 100–199)
CO2 SERPL-SCNC: 25 MMOL/L (ref 20–33)
CREAT SERPL-MCNC: 0.57 MG/DL (ref 0.5–1.4)
EST. AVERAGE GLUCOSE BLD GHB EST-MCNC: 148 MG/DL
GFR SERPLBLD CREATININE-BSD FMLA CKD-EPI: 118 ML/MIN/1.73 M 2
GLOBULIN SER CALC-MCNC: 3.4 G/DL (ref 1.9–3.5)
GLUCOSE SERPL-MCNC: 157 MG/DL (ref 65–99)
HBA1C MFR BLD: 6.8 % (ref 4–5.6)
HDLC SERPL-MCNC: 50 MG/DL
LDLC SERPL CALC-MCNC: 170 MG/DL
POTASSIUM SERPL-SCNC: 4.8 MMOL/L (ref 3.6–5.5)
PROT SERPL-MCNC: 7.7 G/DL (ref 6–8.2)
SODIUM SERPL-SCNC: 140 MMOL/L (ref 135–145)
TRIGL SERPL-MCNC: 78 MG/DL (ref 0–149)

## 2022-08-19 PROCEDURE — 83036 HEMOGLOBIN GLYCOSYLATED A1C: CPT

## 2022-08-19 PROCEDURE — 36415 COLL VENOUS BLD VENIPUNCTURE: CPT

## 2022-08-19 PROCEDURE — 80061 LIPID PANEL: CPT

## 2022-08-19 PROCEDURE — 80053 COMPREHEN METABOLIC PANEL: CPT

## 2023-03-29 ENCOUNTER — OFFICE VISIT (OUTPATIENT)
Dept: URGENT CARE | Facility: CLINIC | Age: 43
End: 2023-03-29
Payer: COMMERCIAL

## 2023-03-29 ENCOUNTER — HOSPITAL ENCOUNTER (OUTPATIENT)
Dept: RADIOLOGY | Facility: MEDICAL CENTER | Age: 43
End: 2023-03-29
Attending: FAMILY MEDICINE
Payer: COMMERCIAL

## 2023-03-29 VITALS
RESPIRATION RATE: 16 BRPM | HEIGHT: 66 IN | HEART RATE: 89 BPM | WEIGHT: 263 LBS | BODY MASS INDEX: 42.27 KG/M2 | TEMPERATURE: 97.9 F | OXYGEN SATURATION: 95 %

## 2023-03-29 DIAGNOSIS — M54.12 CERVICAL RADICULOPATHY: ICD-10-CM

## 2023-03-29 DIAGNOSIS — R51.9 NONINTRACTABLE HEADACHE, UNSPECIFIED CHRONICITY PATTERN, UNSPECIFIED HEADACHE TYPE: ICD-10-CM

## 2023-03-29 PROCEDURE — 99214 OFFICE O/P EST MOD 30 MIN: CPT | Performed by: FAMILY MEDICINE

## 2023-03-29 PROCEDURE — 72141 MRI NECK SPINE W/O DYE: CPT

## 2023-03-29 PROCEDURE — 70450 CT HEAD/BRAIN W/O DYE: CPT

## 2023-03-29 RX ORDER — CYCLOBENZAPRINE HCL 10 MG
10 TABLET ORAL 3 TIMES DAILY PRN
Qty: 30 TABLET | Refills: 0 | Status: SHIPPED | OUTPATIENT
Start: 2023-03-29

## 2023-03-29 ASSESSMENT — FIBROSIS 4 INDEX: FIB4 SCORE: 1.02

## 2023-03-29 NOTE — PROGRESS NOTES
"Subjective:      Chief Complaint   Patient presents with    Back Pain     Upper x 3 weeks with headaches and numbness down L arm. No known injury.                 Neck Pain         Pt c/o dull, constant left sided neck pain x 1 mth      Denies injury or trauma.     + headaches x 3 wks     The quality of the pain is described as   aching. The pain is moderate.   Pain frequently radiates down left arm, with tingling as well.      The symptoms are aggravated by position. Associated symptoms include : none.   Pertinent negatives include no fever, headaches, numbness, pain with swallowing    . Pt has tried nothing for the symptoms.           Current Outpatient Medications on File Prior to Visit   Medication Sig Dispense Refill    ibuprofen (MOTRIN) 200 MG Tab Take 400-600 mg by mouth every 6 hours as needed for Headache.      Omega-3 Fatty Acids (FISH OIL PO) Take 1 tablet by mouth every day.      Ginkgo Biloba (GINKOBA PO) Take 1 tablet by mouth every day.      BIOTIN PO Take 1 tablet by mouth every day.      VITAMIN D PO Take 1 tablet by mouth every day.      escitalopram (LEXAPRO) 20 MG tablet Take 20 mg by mouth every day.      verapamil (ISOPTIN) 40 MG Tab Take 2 Tablets by mouth every 8 hours. 180 Tablet 2    acetaminophen (TYLENOL) 500 MG Tab Take 500-1,000 mg by mouth every 6 hours as needed.      ondansetron (ZOFRAN ODT) 4 MG TABLET DISPERSIBLE Take 1 tablet by mouth every 8 hours as needed. 20 tablet 0     No current facility-administered medications on file prior to visit.       Review of Systems   Constitutional: Negative for fever.   Musculoskeletal: Positive for neck pain.   Neurological:  Negative for  numbness   + headaches.          Objective:     BP (!) 146/78   Pulse 89   Temp 36.6 °C (97.9 °F) (Temporal)   Resp 16   Ht 1.676 m (5' 6\")   Wt 119 kg (263 lb)   SpO2 95%         Physical Exam   Constitutional: pt is oriented to person, place, and time. Pt appears well-developed and well-nourished. No " distress.   HENT:   Head: Normocephalic and atraumatic.   Eyes: Conjunctivae are normal.   Cardiovascular: Normal rate and regular rhythm.    Pulmonary/Chest: Effort normal and breath sounds normal. No respiratory distress. Pt has no wheezes.   Musculoskeletal:        Cervical spine: pt exhibits decreased range of motion, tenderness and spasm. Pt exhibits no swelling.   Neurological: pt is alert and oriented to person, place, and time.   Strength:    Deltoid - 5/5 on right  5/5 on left     - 5/5 on right, 5/5 on left   Skin: Skin is warm. Pt is not diaphoretic. No erythema.   Psychiatric:  Pt's behavior is normal.   Nursing note and vitals reviewed.         CT-HEAD W/O  Order: [id number]  Status: Final result     Visible to patient: Yes (seen)     Next appt: None     Dx: Cervical radiculopathy     0 Result Notes    1 Patient Communication  Details    Reading Physician Reading Date Result Priority   Noa Mendieta M.D.  [phone] 3/29/2023 Stat-Call Report     Narrative & Impression     3/29/2023 5:08 PM     HISTORY/REASON FOR EXAM:  Headache and neck pain for 3 weeks..        TECHNIQUE/EXAM DESCRIPTION AND NUMBER OF VIEWS:  CT of the head without contrast.     The study was performed on a helical multidetector CT scanner. Contiguous axial sections were obtained from the skull base through the vertex.     Up to date radiation dose reduction adjustments have been utilized to meet ALARA standards for radiation dose reduction.     COMPARISON:  CT head 5/27/2021     FINDINGS:  The calvariae and skull base are unremarkable.     There are no extraaxial fluid collections.     The ventricular system and basal cisterns are within normal limits.     There are no areas of abnormal density in the brain substance.     There is no acute intracranial  hemorrhage.     There is no mass effect or midline shift.     The brainstem and posterior fossa structures are unremarkable.     Paranasal sinuses in the field of view are  unremarkable.     Mastoids in the field of view are unremarkable.        IMPRESSION:     1.  Head CT without contrast within normal limits. No evidence of acute cerebral infarction, hemorrhage or mass lesion.     2.  A secure VOALTE message was sent and confirmed received to WILBERTO HULL SHAMIKASALVADORSARAH on 3/29/2023 at 5:31 PM.              Exam Ended: 03/29/23  5:24 PM Last Resulted: 03/29/23  5:34 PM              Details    Reading Physician Reading Date Result Priority   Wing Perkins M.D.  [phone] 3/30/2023 Stat-Call Report     Narrative & Impression     3/29/2023 5:17 PM     HISTORY/REASON FOR EXAM: Neck pain.        TECHNIQUE/EXAM DESCRIPTION:  MRI of the cervical spine without contrast.     The study was performed on a Jaylon 1.5 Bouchra MRI scanner. T1 sagittal, T2 fast spin-echo sagittal, and gradient-echo axial images were obtained of the cervical spine.     COMPARISON: None.     FINDINGS:  There is mild kyphotic deformity. There is multilevel loss of the normal intervertebral disc bright T2 signal. The spinal cord is normal in caliber and signal.     Findings at specific levels:     C2-3: There is minimal asymmetric right posterior osseous spurring and intervening disc bulge. No central canal or neural foraminal narrowing.     C3-4: There is intimal asymmetric right posterior osseous spurring and intervening disc bulge. No central canal or neural foraminal narrowing.     C4-5: There is minimal annular disc bulge and posterior osseous spurring asymmetric to the right of midline. No central canal or neural foraminal narrowing.     C5-6: There is asymmetric right posterior osseous spurring and intervening disc bulge which results in slight underlying cord contact. No central canal narrowing. There is moderate right-sided neural foraminal narrowing.     C6-7: There is annular disc bulge and posterior osseous spurring. No central canal narrowing. There is mild right and mild to moderate left neuroforaminal  narrowing.     C7-T1: No significant central canal or neural foraminal narrowing.     IMPRESSION:     1.  Multilevel degenerative disc disease, uncinate and facet degeneration. There is no central canal narrowing. There are varying degrees of neural foraminal narrowing at levels as specifically described above.     2.  Loss of the normal cervical lordosis.           Exam Ended: 03/29/23  5:41 PM Last Resulted: 03/30/23  7:53 AM           Assessment/Plan:            1. Cervical radiculopathy   MRI reviewed - DDD changes noted, but no obvious disk herniation      - diclofenac sodium (VOLTAREN) 1 % Gel; Apply 4 g topically in the morning, at noon, and at bedtime.  Dispense: 50 g; Refill: 0  - cyclobenzaprine (FLEXERIL) 10 mg Tab; Take 1 Tablet by mouth 3 times a day as needed for Muscle Spasms.  Dispense: 30 Tablet; Refill: 0  - Referral to Pain Management    2. Nonintractable headache, unspecified chronicity pattern, unspecified headache type   CT reviewed and was unremarkable.       rx motrin 800mg tid prn        Follow up in one week if no improvement, sooner if symptoms worsen.

## 2023-04-17 ENCOUNTER — OFFICE VISIT (OUTPATIENT)
Dept: URGENT CARE | Facility: CLINIC | Age: 43
End: 2023-04-17
Payer: COMMERCIAL

## 2023-04-17 VITALS
WEIGHT: 262.2 LBS | RESPIRATION RATE: 14 BRPM | BODY MASS INDEX: 42.14 KG/M2 | HEIGHT: 66 IN | OXYGEN SATURATION: 93 % | DIASTOLIC BLOOD PRESSURE: 104 MMHG | SYSTOLIC BLOOD PRESSURE: 154 MMHG | HEART RATE: 82 BPM | TEMPERATURE: 97.8 F

## 2023-04-17 DIAGNOSIS — J40 BRONCHITIS: ICD-10-CM

## 2023-04-17 PROCEDURE — 99213 OFFICE O/P EST LOW 20 MIN: CPT

## 2023-04-17 RX ORDER — METHYLPREDNISOLONE 4 MG/1
TABLET ORAL
Qty: 21 TABLET | Refills: 0 | Status: SHIPPED | OUTPATIENT
Start: 2023-04-17

## 2023-04-17 RX ORDER — BENZONATATE 100 MG/1
100 CAPSULE ORAL 3 TIMES DAILY PRN
Qty: 30 CAPSULE | Refills: 0 | Status: SHIPPED | OUTPATIENT
Start: 2023-04-17

## 2023-04-17 RX ORDER — PIOGLITAZONE HCL AND METFORMIN HCL 850; 15 MG/1; MG/1
TABLET ORAL
COMMUNITY
Start: 2023-03-04

## 2023-04-17 ASSESSMENT — ENCOUNTER SYMPTOMS
SPUTUM PRODUCTION: 0
CHILLS: 0
HEMOPTYSIS: 0
WHEEZING: 0
COUGH: 1
STRIDOR: 0
FEVER: 0
SINUS PAIN: 0
SORE THROAT: 0
SHORTNESS OF BREATH: 0

## 2023-04-17 ASSESSMENT — FIBROSIS 4 INDEX: FIB4 SCORE: 1.02

## 2023-04-17 NOTE — PROGRESS NOTES
Subjective:   Tae Banegas is a 42 y.o. female who presents for Cough (X 1 week)      HPI: This is a 42-year-old female who presents today for cough.  Patient reports dry nonproductive cough x1 week.  She reports associated runny nose.  She reports having multiple negative home COVID test.  She has taken cough drops and over-the-counter cold and flu medications without any improvement in her cough.  She denies fevers, chills, body aches.  No sick contacts.  No wheezing or shortness of breath.  No underlying lung disease.  Patient reports feeling well overall outside of having persistent cough.      Review of Systems   Constitutional:  Negative for chills, fever and malaise/fatigue.   HENT:  Negative for congestion, ear discharge, ear pain, sinus pain and sore throat.    Respiratory:  Positive for cough. Negative for hemoptysis, sputum production, shortness of breath, wheezing and stridor.      Medications:    Current Outpatient Medications on File Prior to Visit   Medication Sig Dispense Refill    pioglitazone-metformin (ACTOPLUS MET)  MG per tablet       ibuprofen (MOTRIN) 200 MG Tab Take 400-600 mg by mouth every 6 hours as needed for Headache.      Omega-3 Fatty Acids (FISH OIL PO) Take 1 tablet by mouth every day.      Ginkgo Biloba (GINKOBA PO) Take 1 tablet by mouth every day.      BIOTIN PO Take 1 tablet by mouth every day.      VITAMIN D PO Take 1 tablet by mouth every day.      escitalopram (LEXAPRO) 20 MG tablet Take 20 mg by mouth every day.      diclofenac sodium (VOLTAREN) 1 % Gel Apply 4 g topically in the morning, at noon, and at bedtime. (Patient not taking: Reported on 4/17/2023) 50 g 0    cyclobenzaprine (FLEXERIL) 10 mg Tab Take 1 Tablet by mouth 3 times a day as needed for Muscle Spasms. (Patient not taking: Reported on 4/17/2023) 30 Tablet 0    verapamil (ISOPTIN) 40 MG Tab Take 2 Tablets by mouth every 8 hours. 180 Tablet 2    acetaminophen (TYLENOL) 500 MG Tab Take 500-1,000 mg by  "mouth every 6 hours as needed.      ondansetron (ZOFRAN ODT) 4 MG TABLET DISPERSIBLE Take 1 tablet by mouth every 8 hours as needed. 20 tablet 0     No current facility-administered medications on file prior to visit.        Allergies:   Penicillins    Problem List:   Patient Active Problem List   Diagnosis    Headache    Erythrocytosis    Elevated fasting blood sugar    Anxiety and depression    Reversible cerebrovascular vasoconstriction syndrome    Class 3 severe obesity in adult (HCC)        Surgical History:  Past Surgical History:   Procedure Laterality Date    RECONSTRUCTION, KNEE, ACL, ARTHROSCOPIC  6/3/2010    Performed by AMANDA MOJICA at SURGERY [facility]    GYN SURGERY  2001    right ovary removed for ovarian cyst    DENTAL EXTRACTION(S)  2000    wisdom teeth        Past Social Hx:   Social History     Tobacco Use    Smoking status: Never    Smokeless tobacco: Never   Vaping Use    Vaping Use: Never used   Substance Use Topics    Alcohol use: Yes     Alcohol/week: 3.5 oz     Types: 7 Standard drinks or equivalent per week     Comment: WEEKENDS    Drug use: No          Problem list, medications, and allergies reviewed by myself today in Epic.     Objective:     BP (!) 154/104   Pulse 82   Temp 36.6 °C (97.8 °F) (Temporal)   Resp 14   Ht 1.676 m (5' 6\") Comment: per pt  Wt 119 kg (262 lb 3.2 oz) Comment: w shoes  LMP 04/07/2023   SpO2 93%   BMI 42.32 kg/m²     Physical Exam  Vitals and nursing note reviewed.   Constitutional:       General: She is not in acute distress.     Appearance: Normal appearance. She is normal weight. She is not ill-appearing, toxic-appearing or diaphoretic.   HENT:      Head: Normocephalic and atraumatic.      Right Ear: Tympanic membrane, ear canal and external ear normal. There is no impacted cerumen.      Left Ear: Tympanic membrane, ear canal and external ear normal. There is no impacted cerumen.      Nose: Rhinorrhea present. No congestion.      " Mouth/Throat:      Mouth: Mucous membranes are moist.      Pharynx: Oropharynx is clear. No oropharyngeal exudate or posterior oropharyngeal erythema.   Cardiovascular:      Rate and Rhythm: Normal rate and regular rhythm.      Pulses: Normal pulses.      Heart sounds: Normal heart sounds. No murmur heard.    No friction rub. No gallop.   Pulmonary:      Effort: Pulmonary effort is normal. No respiratory distress.      Breath sounds: Normal breath sounds. No stridor. No wheezing, rhonchi or rales.   Chest:      Chest wall: No tenderness.   Musculoskeletal:      Cervical back: Neck supple. No tenderness.   Lymphadenopathy:      Cervical: No cervical adenopathy.   Skin:     General: Skin is warm and dry.      Capillary Refill: Capillary refill takes less than 2 seconds.   Neurological:      General: No focal deficit present.      Mental Status: She is alert and oriented to person, place, and time. Mental status is at baseline.      Cranial Nerves: No cranial nerve deficit.      Motor: No weakness.      Gait: Gait normal.   Psychiatric:         Mood and Affect: Mood normal.         Behavior: Behavior normal.         Thought Content: Thought content normal.         Judgment: Judgment normal.       Assessment/Plan:     Diagnosis and associated orders:   1. Bronchitis  methylPREDNISolone (MEDROL DOSEPAK) 4 MG Tablet Therapy Pack    benzonatate (TESSALON) 100 MG Cap             Comments/MDM:   Pt is clinically stable at today's acute urgent care visit.  No acute distress noted. Appropriate for outpatient management at this time.     Acute problem.  Patient is not ill or toxic appearing in clinic today.  Patient will be treated for bronchitis with Medrol Dosepak and Tessalon Perles.  Advised patient begin taking these medications as they are prescribed, stay well-hydrated, warm fluids with honey, use humidifier.Patient is to return to  or go to ER for any new or worsening signs or symptoms, and follow with with PCP for  recheck. Patient is agreeable with plan of care and verbalizes good understanding.             Discussed DDx, management options (risks,benefits, and alternatives to planned treatment), natural progression and supportive care.  Expressed understanding and the treatment plan was agreed upon. Questions were encouraged and answered   Return to urgent care prn if new or worsening sx or if there is no improvement in condition prn.    Educated in Red flags and indications to immediately call 911 or present to the Emergency Department.   Advised the patient to follow-up with the primary care physician for recheck, reevaluation, and consideration of further management.    I personally reviewed prior external notes and test results pertinent to today's visit.  I have independently reviewed and interpreted all diagnostics ordered during this urgent care acute visit.       Please note that this dictation was created using voice recognition software. I have made a reasonable attempt to correct obvious errors, but I expect that there are errors of grammar and possibly content that I did not discover before finalizing the note.    This note was electronically signed by VALARIE Lubin

## 2023-04-17 NOTE — LETTER
April 17, 2023    To Whom It May Concern:         This is confirmation that Tae ROMARIO Banegas attended her scheduled appointment with GABRIELLE Agarwal on 4/17/23. Please excuse her from work 4/17/23-4/18/23.         If you have any questions please do not hesitate to call me at the phone number listed below.    Sincerely,          CHICA Agarwal.  [phone]

## 2024-02-15 ENCOUNTER — APPOINTMENT (OUTPATIENT)
Dept: LAB | Facility: MEDICAL CENTER | Age: 44
End: 2024-02-15
Payer: COMMERCIAL

## 2024-02-16 ENCOUNTER — HOSPITAL ENCOUNTER (OUTPATIENT)
Dept: LAB | Facility: MEDICAL CENTER | Age: 44
End: 2024-02-16
Attending: FAMILY MEDICINE
Payer: COMMERCIAL

## 2024-02-16 ENCOUNTER — HOSPITAL ENCOUNTER (OUTPATIENT)
Dept: RADIOLOGY | Facility: MEDICAL CENTER | Age: 44
End: 2024-02-16
Attending: FAMILY MEDICINE
Payer: COMMERCIAL

## 2024-02-16 DIAGNOSIS — Z12.31 VISIT FOR SCREENING MAMMOGRAM: ICD-10-CM

## 2024-02-16 LAB
ALBUMIN SERPL BCP-MCNC: 4.2 G/DL (ref 3.2–4.9)
ALBUMIN/GLOB SERPL: 1.4 G/DL
ALP SERPL-CCNC: 60 U/L (ref 30–99)
ALT SERPL-CCNC: 51 U/L (ref 2–50)
ANION GAP SERPL CALC-SCNC: 14 MMOL/L (ref 7–16)
AST SERPL-CCNC: 31 U/L (ref 12–45)
BILIRUB SERPL-MCNC: 0.5 MG/DL (ref 0.1–1.5)
BUN SERPL-MCNC: 10 MG/DL (ref 8–22)
CALCIUM ALBUM COR SERPL-MCNC: 8.9 MG/DL (ref 8.5–10.5)
CALCIUM SERPL-MCNC: 9.1 MG/DL (ref 8.5–10.5)
CHLORIDE SERPL-SCNC: 102 MMOL/L (ref 96–112)
CHOLEST SERPL-MCNC: 202 MG/DL (ref 100–199)
CO2 SERPL-SCNC: 26 MMOL/L (ref 20–33)
CREAT SERPL-MCNC: 0.53 MG/DL (ref 0.5–1.4)
CREAT UR-MCNC: 225.9 MG/DL
EST. AVERAGE GLUCOSE BLD GHB EST-MCNC: 186 MG/DL
GFR SERPLBLD CREATININE-BSD FMLA CKD-EPI: 119 ML/MIN/1.73 M 2
GLOBULIN SER CALC-MCNC: 3.1 G/DL (ref 1.9–3.5)
GLUCOSE SERPL-MCNC: 123 MG/DL (ref 65–99)
HBA1C MFR BLD: 8.1 % (ref 4–5.6)
HDLC SERPL-MCNC: 57 MG/DL
LDLC SERPL CALC-MCNC: 127 MG/DL
MICROALBUMIN UR-MCNC: 3.7 MG/DL
MICROALBUMIN/CREAT UR: 16 MG/G (ref 0–30)
POTASSIUM SERPL-SCNC: 4.1 MMOL/L (ref 3.6–5.5)
PROT SERPL-MCNC: 7.3 G/DL (ref 6–8.2)
SODIUM SERPL-SCNC: 142 MMOL/L (ref 135–145)
TRIGL SERPL-MCNC: 89 MG/DL (ref 0–149)

## 2024-02-16 PROCEDURE — 36415 COLL VENOUS BLD VENIPUNCTURE: CPT

## 2024-02-16 PROCEDURE — 80053 COMPREHEN METABOLIC PANEL: CPT

## 2024-02-16 PROCEDURE — 77067 SCR MAMMO BI INCL CAD: CPT

## 2024-02-16 PROCEDURE — 82043 UR ALBUMIN QUANTITATIVE: CPT

## 2024-02-16 PROCEDURE — 83036 HEMOGLOBIN GLYCOSYLATED A1C: CPT

## 2024-02-16 PROCEDURE — 80061 LIPID PANEL: CPT

## 2024-02-16 PROCEDURE — 82570 ASSAY OF URINE CREATININE: CPT

## 2024-03-08 ENCOUNTER — HOSPITAL ENCOUNTER (OUTPATIENT)
Dept: LAB | Facility: MEDICAL CENTER | Age: 44
End: 2024-03-08
Attending: PHYSICIAN ASSISTANT
Payer: COMMERCIAL

## 2024-03-08 PROCEDURE — 87624 HPV HI-RISK TYP POOLED RSLT: CPT

## 2024-03-08 PROCEDURE — 88175 CYTOPATH C/V AUTO FLUID REDO: CPT

## 2024-03-17 LAB
CYTOLOGIST CVX/VAG CYTO: NORMAL
CYTOLOGY CVX/VAG DOC CYTO: NORMAL
CYTOLOGY CVX/VAG DOC THIN PREP: NORMAL
HPV I/H RISK 4 DNA CVX QL PROBE+SIG AMP: NEGATIVE
NOTE NL11727A: NORMAL
OTHER STN SPEC: NORMAL
STAT OF ADQ CVX/VAG CYTO-IMP: NORMAL

## 2025-01-15 ENCOUNTER — OFFICE VISIT (OUTPATIENT)
Dept: URGENT CARE | Facility: CLINIC | Age: 45
End: 2025-01-15
Payer: COMMERCIAL

## 2025-01-15 VITALS
HEIGHT: 66 IN | TEMPERATURE: 97.3 F | RESPIRATION RATE: 16 BRPM | BODY MASS INDEX: 40.98 KG/M2 | OXYGEN SATURATION: 97 % | HEART RATE: 68 BPM | SYSTOLIC BLOOD PRESSURE: 122 MMHG | WEIGHT: 255 LBS | DIASTOLIC BLOOD PRESSURE: 72 MMHG

## 2025-01-15 DIAGNOSIS — J06.9 VIRAL UPPER RESPIRATORY TRACT INFECTION WITH COUGH: ICD-10-CM

## 2025-01-15 LAB
FLUAV RNA SPEC QL NAA+PROBE: NEGATIVE
FLUBV RNA SPEC QL NAA+PROBE: NEGATIVE
RSV RNA SPEC QL NAA+PROBE: NEGATIVE
SARS-COV-2 RNA RESP QL NAA+PROBE: NEGATIVE

## 2025-01-15 PROCEDURE — 99213 OFFICE O/P EST LOW 20 MIN: CPT | Performed by: NURSE PRACTITIONER

## 2025-01-15 PROCEDURE — 0241U POCT CEPHEID COV-2, FLU A/B, RSV - PCR: CPT | Performed by: NURSE PRACTITIONER

## 2025-01-15 RX ORDER — ROSUVASTATIN CALCIUM 10 MG/1
10 TABLET, COATED ORAL EVERY EVENING
COMMUNITY

## 2025-01-15 RX ORDER — TIRZEPATIDE 2.5 MG/.5ML
INJECTION, SOLUTION SUBCUTANEOUS
COMMUNITY

## 2025-01-15 RX ORDER — BENZONATATE 100 MG/1
100 CAPSULE ORAL 3 TIMES DAILY PRN
Qty: 60 CAPSULE | Refills: 0 | Status: SHIPPED | OUTPATIENT
Start: 2025-01-15

## 2025-01-15 NOTE — LETTER
January 15, 2025         Patient: Tae Banegas   YOB: 1980   Date of Visit: 1/15/2025           To Whom it May Concern:    Tae Banegsa was seen in my clinic on 1/15/2025. She may be excused from work today, tomorrow, and Friday.    If you have any questions or concerns, please don't hesitate to call.        Sincerely,           CHICA Small.  Electronically Signed

## 2025-01-15 NOTE — PROGRESS NOTES
"Chief Complaint   Patient presents with    Cough     X 1 day, nasal congestion        HISTORY OF PRESENT ILLNESS: Patient is a pleasant 44 y.o. female who presents today due to symptoms which started yesterday. Pt reports a cough, nasal congestion, mild sore throat, fatigue, malaise. Denies chest pain, shortness of breath, or wheezing. Denies h/o asthma/copd/CAP. No immunocompromise. Has not tried OTC for relief of symptoms. No recent ABX use. No other aggravating or alleviating factors. Denies known exposure to COVID-19. She does work with the [employer] population.       Patient Active Problem List    Diagnosis Date Noted    Reversible cerebrovascular vasoconstriction syndrome 05/28/2021    Class 3 severe obesity in adult (HCC) 05/28/2021    Headache 05/27/2021    Erythrocytosis 05/27/2021    Elevated fasting blood sugar 05/27/2021    Anxiety and depression 05/27/2021       Allergies:Penicillins    Current Outpatient Medications Ordered in Epic   Medication Sig Dispense Refill    rosuvastatin (CRESTOR) 10 MG Tab Take 10 mg by mouth every evening.      Tirzepatide (MOUNJARO) 2.5 MG/0.5ML Solution Auto-injector Inject  under the skin.      pioglitazone-metformin (ACTOPLUS MET)  MG per tablet       ibuprofen (MOTRIN) 200 MG Tab Take 400-600 mg by mouth every 6 hours as needed for Headache.      Omega-3 Fatty Acids (FISH OIL PO) Take 1 tablet by mouth every day.      Ginkgo Biloba (GINKOBA PO) Take 1 tablet by mouth every day.      BIOTIN PO Take 1 tablet by mouth every day.      VITAMIN D PO Take 1 tablet by mouth every day.      escitalopram (LEXAPRO) 20 MG tablet Take 20 mg by mouth every day.       No current [facility]-ordered facility-administered medications on file.       Past Medical History:   Diagnosis Date    Other specified symptom associated with female genital organs     \"irregular periods\"    Psychiatric disorder     anxiety - medicated, none currently    Unspecified disorder of menstruation and other " "abnormal bleeding from female genital tract     was on medication, but not now       Social History     Tobacco Use    Smoking status: Never    Smokeless tobacco: Never   Vaping Use    Vaping status: Never Used   Substance Use Topics    Alcohol use: Yes     Alcohol/week: 3.5 oz     Types: 7 Standard drinks or equivalent per week     Comment: WEEKENDS    Drug use: No       Family Status   Relation Name Status    Mo  Alive    Fa  Alive    OTHER  (Not Specified)    OTHER  (Not Specified)   No partnership data on file     Family History   Problem Relation Age of Onset    Cancer Mother     Heart Disease Other     Cancer Other        ROS:  Review of Systems   Constitutional: Positive for fatigue, malaise. Negative for weight loss.  HENT: Positive for congestion, ear pressure, and sore throat. Negative for ear pain, nosebleeds, and neck pain.    Eyes: Negative for vision changes.   Cardiovascular: Negative for chest pain, palpitations, orthopnea and leg swelling.   Respiratory: Positive for cough. Negative for shortness of breath and wheezing.   Gastrointestinal: Negative for abdominal pain, nausea, vomiting or diarrhea.   Skin: Negative for rash, diaphoresis.     Exam:  /72   Pulse 68   Temp 36.3 °C (97.3 °F)   Resp 16   Ht 1.676 m (5' 6\")   Wt 116 kg (255 lb)   SpO2 97%   General: well-nourished, well-developed female in NAD  Head: normocephalic, atraumatic  Eyes: PERRLA, EOM within normal limits, no conjunctival injection, no scleral icterus, visual fields and acuity grossly intact.  Ears: normal shape and symmetry, no tenderness, no discharge. External canals are without any significant edema or erythema. Tympanic membranes are without any inflammation, no effusion. Gross auditory acuity is intact.  Nose: symmetrical without tenderness, mild discharge, erythema present bilateral nares.  Mouth/Throat: reasonable hygiene, no exudates or tonsillar enlargement. Mild erythema present.   Neck: no masses, range of " motion within normal limits, no tracheal deviation.  Lymph: mild cervical adenopathy. No supraclavicular adenopathy.   Neuro: alert and oriented. Cranial nerves 1-12 grossly intact.   Cardiovascular: regular rate and rhythm without murmurs, rubs, or gallops. No edema.   Pulmonary: no distress. Chest is symmetrical with respiration. No wheezes, crackles, or rhonchi.   Musculoskeletal: appropriate muscle tone, gait is stable.  Skin: warm, dry, intact, no clubbing, no cyanosis.   Psych: appropriate mood, affect, judgement.         Assessment/Plan:  1. Viral upper respiratory tract infection with cough  POCT CoV-2, Flu A/B, RSV by PCR              Discussed symptoms most likely viral, self limiting illness. Did not see any evidence of a bacterial process.  Viral panel pending.  Discussed natural progression and sx care.  Rest, increase fluids, hand and respiratory hygiene.  Tessalon for cough.  Supportive care, differential diagnoses, and indications for immediate follow-up discussed with patient.   Pathogenesis of diagnosis discussed including typical length and natural progression.  Instructed to return to clinic or nearest emergency department for any change in condition, further concerns, or worsening of symptoms.  Patient states understanding of the plan of care and discharge instructions.  Instructed to make an appointment with her primary care provider in the next 3-5 days if not significantly improving and for further care.         Please note that this dictation was created using voice recognition software. I have made every reasonable attempt to correct obvious errors, but I expect that there are errors of grammar and possibly content that I did not discover before finalizing the note.      CHICA Small.

## 2025-04-01 ENCOUNTER — HOSPITAL ENCOUNTER (OUTPATIENT)
Dept: LAB | Facility: MEDICAL CENTER | Age: 45
End: 2025-04-01
Attending: FAMILY MEDICINE
Payer: COMMERCIAL

## 2025-04-01 LAB
ALBUMIN SERPL BCP-MCNC: 4.2 G/DL (ref 3.2–4.9)
ALBUMIN/GLOB SERPL: 1.3 G/DL
ALP SERPL-CCNC: 49 U/L (ref 30–99)
ALT SERPL-CCNC: 58 U/L (ref 2–50)
ANION GAP SERPL CALC-SCNC: 12 MMOL/L (ref 7–16)
AST SERPL-CCNC: 38 U/L (ref 12–45)
BILIRUB SERPL-MCNC: 0.3 MG/DL (ref 0.1–1.5)
BUN SERPL-MCNC: 9 MG/DL (ref 8–22)
CALCIUM ALBUM COR SERPL-MCNC: 8.9 MG/DL (ref 8.5–10.5)
CALCIUM SERPL-MCNC: 9.1 MG/DL (ref 8.5–10.5)
CHLORIDE SERPL-SCNC: 105 MMOL/L (ref 96–112)
CHOLEST SERPL-MCNC: 140 MG/DL (ref 100–199)
CO2 SERPL-SCNC: 23 MMOL/L (ref 20–33)
CREAT SERPL-MCNC: 0.57 MG/DL (ref 0.5–1.4)
CREAT UR-MCNC: 109 MG/DL
EST. AVERAGE GLUCOSE BLD GHB EST-MCNC: 123 MG/DL
FASTING STATUS PATIENT QL REPORTED: NORMAL
GFR SERPLBLD CREATININE-BSD FMLA CKD-EPI: 116 ML/MIN/1.73 M 2
GLOBULIN SER CALC-MCNC: 3.2 G/DL (ref 1.9–3.5)
GLUCOSE SERPL-MCNC: 138 MG/DL (ref 65–99)
HBA1C MFR BLD: 5.9 % (ref 4–5.6)
HDLC SERPL-MCNC: 66 MG/DL
LDLC SERPL CALC-MCNC: 59 MG/DL
MICROALBUMIN UR-MCNC: <1.2 MG/DL
MICROALBUMIN/CREAT UR: NORMAL MG/G (ref 0–30)
POTASSIUM SERPL-SCNC: 4.2 MMOL/L (ref 3.6–5.5)
PROT SERPL-MCNC: 7.4 G/DL (ref 6–8.2)
SODIUM SERPL-SCNC: 140 MMOL/L (ref 135–145)
TRIGL SERPL-MCNC: 77 MG/DL (ref 0–149)

## 2025-04-01 PROCEDURE — 80061 LIPID PANEL: CPT

## 2025-04-01 PROCEDURE — 82043 UR ALBUMIN QUANTITATIVE: CPT

## 2025-04-01 PROCEDURE — 80053 COMPREHEN METABOLIC PANEL: CPT

## 2025-04-01 PROCEDURE — 82570 ASSAY OF URINE CREATININE: CPT

## 2025-04-01 PROCEDURE — 36415 COLL VENOUS BLD VENIPUNCTURE: CPT

## 2025-04-01 PROCEDURE — 83036 HEMOGLOBIN GLYCOSYLATED A1C: CPT
